# Patient Record
Sex: FEMALE | Race: BLACK OR AFRICAN AMERICAN | NOT HISPANIC OR LATINO
[De-identification: names, ages, dates, MRNs, and addresses within clinical notes are randomized per-mention and may not be internally consistent; named-entity substitution may affect disease eponyms.]

---

## 2017-09-28 PROBLEM — Z00.00 ENCOUNTER FOR PREVENTIVE HEALTH EXAMINATION: Status: ACTIVE | Noted: 2017-09-28

## 2019-09-24 ENCOUNTER — APPOINTMENT (OUTPATIENT)
Dept: SURGERY | Facility: CLINIC | Age: 34
End: 2019-09-24
Payer: MEDICAID

## 2019-09-24 VITALS
WEIGHT: 180 LBS | HEART RATE: 85 BPM | TEMPERATURE: 98.5 F | BODY MASS INDEX: 33.13 KG/M2 | DIASTOLIC BLOOD PRESSURE: 78 MMHG | SYSTOLIC BLOOD PRESSURE: 115 MMHG | HEIGHT: 62 IN

## 2019-09-24 DIAGNOSIS — Z86.2 PERSONAL HISTORY OF DISEASES OF THE BLOOD AND BLOOD-FORMING ORGANS AND CERTAIN DISORDERS INVOLVING THE IMMUNE MECHANISM: ICD-10-CM

## 2019-09-24 DIAGNOSIS — Z78.9 OTHER SPECIFIED HEALTH STATUS: ICD-10-CM

## 2019-09-24 DIAGNOSIS — Z83.3 FAMILY HISTORY OF DIABETES MELLITUS: ICD-10-CM

## 2019-09-24 DIAGNOSIS — L02.31 CUTANEOUS ABSCESS OF BUTTOCK: ICD-10-CM

## 2019-09-24 DIAGNOSIS — Z87.09 PERSONAL HISTORY OF OTHER DISEASES OF THE RESPIRATORY SYSTEM: ICD-10-CM

## 2019-09-24 DIAGNOSIS — Z82.49 FAMILY HISTORY OF ISCHEMIC HEART DISEASE AND OTHER DISEASES OF THE CIRCULATORY SYSTEM: ICD-10-CM

## 2019-09-24 PROCEDURE — 99203 OFFICE O/P NEW LOW 30 MIN: CPT

## 2019-09-24 NOTE — PHYSICAL EXAM
[Normal Rate and Rhythm] : normal rate and rhythm [Normal Breath Sounds] : Normal breath sounds [No Rash or Lesion] : No rash or lesion [Oriented to Person] : oriented to person [Alert] : alert [Oriented to Place] : oriented to place [Calm] : calm [Oriented to Time] : oriented to time [de-identified] : A/Ox3; NAD. appears comfortable [de-identified] : supple, no JVD [de-identified] : EOMI [de-identified] : abd is soft, NT/ND\par  [de-identified] : MYRTLE is negative, no masses felt, no evidence of anal fistula. she has an abscess, with a lot of tenderness to palpation to the R. inner aspect of buttocks ; scar noted to the L. inner buttock region likely from previous abscess  [de-identified] : +ROM, no joint swelling

## 2019-09-24 NOTE — HISTORY OF PRESENT ILLNESS
[de-identified] : 34 y.o F presents w the cc of having a painful abscess to the R. inner aspect of buttock region. She states she first noticed the abscess about 2 weeks ago, and she had presented to the urgent care and was given Rx for PO Antibiotics. She was instructed to return in 1 week, however, she states the pain had not improved and worsened. She had gone to [facility], last week Saturday and had an I&D. She also had a CT scan done which had showed an abscess and inguinal adenopathy. \par Patient states this is the second time she has experienced this , with first episode occurring 1 year ago to the other side. She has a scar noted on the L. inner buttock region, likely 2/2 I&D. Denies any abdominal pain. No nausea/diarrhea reported. \par Previous surgical hx includes .

## 2019-09-24 NOTE — REVIEW OF SYSTEMS
[Fever] : no fever [Chills] : no chills [Feeling Poorly] : not feeling poorly [Sore Throat] : no sore throat [Eyesight Problems] : no eyesight problems [Chest Pain] : no chest pain [Shortness Of Breath] : no shortness of breath [Wheezing] : no wheezing [Lower Ext Edema] : no lower extremity edema [Abdominal Pain] : no abdominal pain [Pelvic Pain] : no pelvic pain [Diarrhea] : no diarrhea [Arthralgias] : no arthralgias [Joint Pain] : no joint pain [Dizziness] : no dizziness [Anxiety] : no anxiety [Muscle Weakness] : no muscle weakness [Swollen Glands] : no swollen glands [de-identified] : has an abscess to the R. buttock, s/p I&D, last week

## 2019-09-24 NOTE — CONSULT LETTER
[Dear  ___] : Dear  [unfilled], [Consult Letter:] : I had the pleasure of evaluating your patient, [unfilled]. [Consult Closing:] : Thank you very much for allowing me to participate in the care of this patient.  If you have any questions, please do not hesitate to contact me. [Sincerely,] : Sincerely, [FreeTextEntry3] : Audie Gonzales MD\par

## 2019-09-24 NOTE — ASSESSMENT
[FreeTextEntry1] : 34 y.o F presents with an abscess to the R. inner buttock, s/p I&D at [facility], 1 week ago.

## 2019-09-24 NOTE — PLAN
[FreeTextEntry1] : abscess appears to be healing, no further I&D indicated at this time\par findings were discussed w the patient in detail\par Patient's questions and concerns addressed to her satisfaction, and patient verbalized an understanding of the information discussed.\par return to office for follow up visit, 1 month , she was instructed to take a fleet enema for rigid sigmoidoscopy for her next visit

## 2019-10-22 ENCOUNTER — APPOINTMENT (OUTPATIENT)
Dept: SURGERY | Facility: CLINIC | Age: 34
End: 2019-10-22

## 2021-06-21 ENCOUNTER — RESULT REVIEW (OUTPATIENT)
Age: 36
End: 2021-06-21

## 2022-06-24 ENCOUNTER — ASOB RESULT (OUTPATIENT)
Age: 37
End: 2022-06-24

## 2022-06-24 ENCOUNTER — APPOINTMENT (OUTPATIENT)
Dept: ANTEPARTUM | Facility: CLINIC | Age: 37
End: 2022-06-24

## 2022-06-24 VITALS
HEIGHT: 62 IN | BODY MASS INDEX: 32.76 KG/M2 | DIASTOLIC BLOOD PRESSURE: 97 MMHG | WEIGHT: 178 LBS | SYSTOLIC BLOOD PRESSURE: 143 MMHG | HEART RATE: 75 BPM

## 2022-06-24 DIAGNOSIS — O09.521 SUPERVISION OF ELDERLY MULTIGRAVIDA, FIRST TRIMESTER: ICD-10-CM

## 2022-06-24 PROCEDURE — 76813 OB US NUCHAL MEAS 1 GEST: CPT

## 2022-06-24 PROCEDURE — 76801 OB US < 14 WKS SINGLE FETUS: CPT | Mod: 59

## 2022-06-24 PROCEDURE — 36416 COLLJ CAPILLARY BLOOD SPEC: CPT

## 2022-06-24 PROCEDURE — 99205 OFFICE O/P NEW HI 60 MIN: CPT | Mod: 25

## 2022-07-05 ENCOUNTER — APPOINTMENT (OUTPATIENT)
Dept: CARDIOLOGY | Facility: CLINIC | Age: 37
End: 2022-07-05

## 2022-07-05 ENCOUNTER — NON-APPOINTMENT (OUTPATIENT)
Age: 37
End: 2022-07-05

## 2022-07-05 VITALS
BODY MASS INDEX: 33.13 KG/M2 | WEIGHT: 180 LBS | OXYGEN SATURATION: 97 % | HEART RATE: 77 BPM | HEIGHT: 62 IN | DIASTOLIC BLOOD PRESSURE: 90 MMHG | SYSTOLIC BLOOD PRESSURE: 141 MMHG

## 2022-07-05 PROCEDURE — 93000 ELECTROCARDIOGRAM COMPLETE: CPT

## 2022-07-05 PROCEDURE — 99204 OFFICE O/P NEW MOD 45 MIN: CPT

## 2022-07-05 RX ORDER — NIFEDIPINE 30 MG/1
30 TABLET, FILM COATED, EXTENDED RELEASE ORAL DAILY
Qty: 30 | Refills: 2 | Status: DISCONTINUED | COMMUNITY
Start: 2022-06-24 | End: 2022-07-05

## 2022-07-05 NOTE — HISTORY OF PRESENT ILLNESS
[FreeTextEntry1] : Ms. LIZZY CRUMP 37 year old F is here 2022 to establish care in the [facility] program.\par Patient is a 37 yr old  with strong Family history of HTN and DM and personal history of pregnancy induced hypertension and prior history of severe preeclampsia is currently pregnant L4 GA 15 weeks. \par Denies chest pain, shortness of breath, dizziness,  palpitations or near syncope or syncope, orthopnea, PND and increasing lower extremity edema. Reports occasional lightheadedness and sees spots in front of her eyes. She was prescribed Procardia by her OB team but reports due to HA is unable to take the medication .\par BP log at home 147- 157/\par \par OB history :\par -  @ [facility] no complications \par - C/ section due to Preeclampsia @ 32 weeks \par - C/ section due to Preeclampsia @ 36 weeks\par  - c/section due to Preclampsia @ 32 weeks \par -  \par - Miscarriage @ 2 months \par - Current pregnancy \par \par # Pregnancy induced hypertension: \par BP labile \par Was prescribed Procardia XL 30 mg ( sever HA ) \par D/c Procardia \par Labetalol 100 mg bid \par Encouraged Patient to monitor BP at home and keep a log and report results back to us for evaluation. Based on results, we will adjust medications as necessary. \par Additionally, encouraged heart healthy diet and exercise as tolerated.\par EKG with no acute changes.\par

## 2022-07-05 NOTE — DISCUSSION/SUMMARY
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 \par \par # Pregnancy induced hypertension: \par BP labile \par D/c Procardia ( unable to tolerate : HA )  \par Labetalol 100 mg bid \par Encouraged Patient to monitor BP at home and keep a log and report results back to us for evaluation. Based on results, we will adjust medications as necessary. \par Additionally, encouraged heart healthy diet and exercise as tolerated.\par Echocardiogram to assess the structural and valvular function.\par \par FU 8 weeks \par

## 2022-07-12 LAB
ALBUMIN SERPL ELPH-MCNC: 4 G/DL
ALP BLD-CCNC: 57 U/L
ALT SERPL-CCNC: 8 U/L
ANION GAP SERPL CALC-SCNC: 14 MMOL/L
APTT 2H P INC PPP: NORMAL
APTT IMM NP/PRE NP PPP: NORMAL
APTT INV RATIO PPP: 28.5 SEC
AST SERPL-CCNC: 16 U/L
B2 GLYCOPROT1 IGG SER-ACNC: <5 SGU
B2 GLYCOPROT1 IGM SER-ACNC: 11.7 SMU
BILIRUB SERPL-MCNC: 0.3 MG/DL
BUN SERPL-MCNC: 7 MG/DL
CALCIUM SERPL-MCNC: 9.2 MG/DL
CARDIOLIPIN IGM SER-MCNC: 9.6 MPL
CARDIOLIPIN IGM SER-MCNC: <5 GPL
CHLORIDE SERPL-SCNC: 99 MMOL/L
CO2 SERPL-SCNC: 23 MMOL/L
CONFIRM: 27.2 SEC
CREAT SERPL-MCNC: 0.57 MG/DL
DRVVT IMM 1:2 NP PPP: NORMAL
DRVVT SCREEN TO CONFIRM RATIO: 1.02 RATIO
EGFR: 121 ML/MIN/1.73M2
GLUCOSE SERPL-MCNC: 39 MG/DL
NPP NORMAL POOLED PLASMA: NORMAL SECS
POTASSIUM SERPL-SCNC: 4.8 MMOL/L
PROT SERPL-MCNC: 7.2 G/DL
SCREEN DRVVT: 32.9 SEC
SILICA CLOTTING TIME INTERPRETATION: NORMAL
SILICA CLOTTING TIME S/C: 0.92 RATIO
SODIUM SERPL-SCNC: 136 MMOL/L

## 2022-07-13 ENCOUNTER — APPOINTMENT (OUTPATIENT)
Dept: ANTEPARTUM | Facility: CLINIC | Age: 37
End: 2022-07-13

## 2022-07-13 ENCOUNTER — ASOB RESULT (OUTPATIENT)
Age: 37
End: 2022-07-13

## 2022-07-13 VITALS
BODY MASS INDEX: 33.31 KG/M2 | DIASTOLIC BLOOD PRESSURE: 91 MMHG | HEART RATE: 81 BPM | HEIGHT: 62 IN | SYSTOLIC BLOOD PRESSURE: 137 MMHG | WEIGHT: 181 LBS

## 2022-07-13 PROCEDURE — 99214 OFFICE O/P EST MOD 30 MIN: CPT

## 2022-07-13 PROCEDURE — 76805 OB US >/= 14 WKS SNGL FETUS: CPT

## 2022-08-09 ENCOUNTER — APPOINTMENT (OUTPATIENT)
Dept: ANTEPARTUM | Facility: CLINIC | Age: 37
End: 2022-08-09

## 2022-08-24 ENCOUNTER — LABORATORY RESULT (OUTPATIENT)
Age: 37
End: 2022-08-24

## 2022-08-24 ENCOUNTER — ASOB RESULT (OUTPATIENT)
Age: 37
End: 2022-08-24

## 2022-08-24 ENCOUNTER — APPOINTMENT (OUTPATIENT)
Dept: ANTEPARTUM | Facility: CLINIC | Age: 37
End: 2022-08-24

## 2022-08-24 PROCEDURE — 76811 OB US DETAILED SNGL FETUS: CPT

## 2022-08-25 DIAGNOSIS — O35.9XX0 MATERNAL CARE FOR (SUSPECTED) FETAL ABNORMALITY AND DAMAGE, UNSPECIFIED, NOT APPLICABLE OR UNSPECIFIED: ICD-10-CM

## 2022-08-29 ENCOUNTER — APPOINTMENT (OUTPATIENT)
Dept: PEDIATRIC CARDIOLOGY | Facility: CLINIC | Age: 37
End: 2022-08-29

## 2022-08-29 PROCEDURE — 99202 OFFICE O/P NEW SF 15 MIN: CPT | Mod: 25

## 2022-08-29 PROCEDURE — 93325 DOPPLER ECHO COLOR FLOW MAPG: CPT

## 2022-08-29 PROCEDURE — 76827 ECHO EXAM OF FETAL HEART: CPT

## 2022-08-29 PROCEDURE — 76825 ECHO EXAM OF FETAL HEART: CPT

## 2022-08-30 ENCOUNTER — APPOINTMENT (OUTPATIENT)
Dept: CARDIOLOGY | Facility: CLINIC | Age: 37
End: 2022-08-30

## 2022-08-30 VITALS
HEIGHT: 62 IN | BODY MASS INDEX: 33.84 KG/M2 | DIASTOLIC BLOOD PRESSURE: 90 MMHG | SYSTOLIC BLOOD PRESSURE: 137 MMHG | TEMPERATURE: 98.4 F | HEART RATE: 96 BPM | OXYGEN SATURATION: 98 %

## 2022-08-30 VITALS — WEIGHT: 195 LBS | BODY MASS INDEX: 35.67 KG/M2

## 2022-08-30 PROCEDURE — 99213 OFFICE O/P EST LOW 20 MIN: CPT | Mod: 25

## 2022-08-30 PROCEDURE — 93000 ELECTROCARDIOGRAM COMPLETE: CPT

## 2022-08-30 NOTE — DISCUSSION/SUMMARY
[EKG obtained to assist in diagnosis and management of assessed problem(s)] : EKG obtained to assist in diagnosis and management of assessed problem(s) [FreeTextEntry1] : 37 year old woman here for followup of PIH. \par \par # Pregnancy induced hypertension: \par \par Labetalol 100 mg bid --increase to 400 mg TID\par \par FU 8 weeks \par

## 2022-09-22 ENCOUNTER — ASOB RESULT (OUTPATIENT)
Age: 37
End: 2022-09-22

## 2022-09-22 ENCOUNTER — OUTPATIENT (OUTPATIENT)
Dept: OUTPATIENT SERVICES | Facility: HOSPITAL | Age: 37
LOS: 1 days | End: 2022-09-22
Payer: MEDICAID

## 2022-09-22 ENCOUNTER — APPOINTMENT (OUTPATIENT)
Dept: ANTEPARTUM | Facility: CLINIC | Age: 37
End: 2022-09-22

## 2022-09-22 VITALS
WEIGHT: 202 LBS | HEART RATE: 103 BPM | BODY MASS INDEX: 37.17 KG/M2 | DIASTOLIC BLOOD PRESSURE: 78 MMHG | SYSTOLIC BLOOD PRESSURE: 123 MMHG | HEIGHT: 62 IN

## 2022-09-22 DIAGNOSIS — O26.899 OTHER SPECIFIED PREGNANCY RELATED CONDITIONS, UNSPECIFIED TRIMESTER: ICD-10-CM

## 2022-09-22 DIAGNOSIS — Z3A.00 WEEKS OF GESTATION OF PREGNANCY NOT SPECIFIED: ICD-10-CM

## 2022-09-22 LAB
ALBUMIN SERPL ELPH-MCNC: 2.4 G/DL — LOW (ref 3.5–5)
ALP SERPL-CCNC: 60 U/L — SIGNIFICANT CHANGE UP (ref 40–120)
ALT FLD-CCNC: 17 U/L DA — SIGNIFICANT CHANGE UP (ref 10–60)
ANION GAP SERPL CALC-SCNC: 9 MMOL/L — SIGNIFICANT CHANGE UP (ref 5–17)
APPEARANCE UR: CLEAR — SIGNIFICANT CHANGE UP
APTT BLD: 24.9 SEC — LOW (ref 27.5–35.5)
AST SERPL-CCNC: 14 U/L — SIGNIFICANT CHANGE UP (ref 10–40)
BACTERIA # UR AUTO: ABNORMAL /HPF
BASOPHILS # BLD AUTO: 0.01 K/UL — SIGNIFICANT CHANGE UP (ref 0–0.2)
BASOPHILS NFR BLD AUTO: 0.2 % — SIGNIFICANT CHANGE UP (ref 0–2)
BILIRUB SERPL-MCNC: 0.3 MG/DL — SIGNIFICANT CHANGE UP (ref 0.2–1.2)
BILIRUB UR-MCNC: NEGATIVE — SIGNIFICANT CHANGE UP
BUN SERPL-MCNC: 6 MG/DL — LOW (ref 7–18)
CALCIUM SERPL-MCNC: 8.5 MG/DL — SIGNIFICANT CHANGE UP (ref 8.4–10.5)
CHLORIDE SERPL-SCNC: 105 MMOL/L — SIGNIFICANT CHANGE UP (ref 96–108)
CO2 SERPL-SCNC: 23 MMOL/L — SIGNIFICANT CHANGE UP (ref 22–31)
COLOR SPEC: YELLOW — SIGNIFICANT CHANGE UP
CREAT ?TM UR-MCNC: 198 MG/DL — SIGNIFICANT CHANGE UP
CREAT SERPL-MCNC: 0.46 MG/DL — LOW (ref 0.5–1.3)
DIFF PNL FLD: ABNORMAL
EGFR: 127 ML/MIN/1.73M2 — SIGNIFICANT CHANGE UP
EOSINOPHIL # BLD AUTO: 0.22 K/UL — SIGNIFICANT CHANGE UP (ref 0–0.5)
EOSINOPHIL NFR BLD AUTO: 3.6 % — SIGNIFICANT CHANGE UP (ref 0–6)
EPI CELLS # UR: ABNORMAL /HPF
FIBRINOGEN PPP-MCNC: 573 MG/DL — HIGH (ref 340–550)
GLUCOSE SERPL-MCNC: 89 MG/DL — SIGNIFICANT CHANGE UP (ref 70–99)
GLUCOSE UR QL: NEGATIVE — SIGNIFICANT CHANGE UP
HCT VFR BLD CALC: 32.7 % — LOW (ref 34.5–45)
HGB BLD-MCNC: 10.4 G/DL — LOW (ref 11.5–15.5)
IMM GRANULOCYTES NFR BLD AUTO: 0.2 % — SIGNIFICANT CHANGE UP (ref 0–0.9)
INR BLD: 0.96 RATIO — SIGNIFICANT CHANGE UP (ref 0.88–1.16)
KETONES UR-MCNC: NEGATIVE — SIGNIFICANT CHANGE UP
LDH SERPL L TO P-CCNC: 137 U/L — SIGNIFICANT CHANGE UP (ref 120–225)
LEUKOCYTE ESTERASE UR-ACNC: ABNORMAL
LYMPHOCYTES # BLD AUTO: 1.7 K/UL — SIGNIFICANT CHANGE UP (ref 1–3.3)
LYMPHOCYTES # BLD AUTO: 27.8 % — SIGNIFICANT CHANGE UP (ref 13–44)
MCHC RBC-ENTMCNC: 24.5 PG — LOW (ref 27–34)
MCHC RBC-ENTMCNC: 31.8 GM/DL — LOW (ref 32–36)
MCV RBC AUTO: 77.1 FL — LOW (ref 80–100)
MONOCYTES # BLD AUTO: 0.46 K/UL — SIGNIFICANT CHANGE UP (ref 0–0.9)
MONOCYTES NFR BLD AUTO: 7.5 % — SIGNIFICANT CHANGE UP (ref 2–14)
NEUTROPHILS # BLD AUTO: 3.72 K/UL — SIGNIFICANT CHANGE UP (ref 1.8–7.4)
NEUTROPHILS NFR BLD AUTO: 60.7 % — SIGNIFICANT CHANGE UP (ref 43–77)
NITRITE UR-MCNC: NEGATIVE — SIGNIFICANT CHANGE UP
NRBC # BLD: 0 /100 WBCS — SIGNIFICANT CHANGE UP (ref 0–0)
PH UR: 6 — SIGNIFICANT CHANGE UP (ref 5–8)
PLATELET # BLD AUTO: 186 K/UL — SIGNIFICANT CHANGE UP (ref 150–400)
POTASSIUM SERPL-MCNC: 3.2 MMOL/L — LOW (ref 3.5–5.3)
POTASSIUM SERPL-SCNC: 3.2 MMOL/L — LOW (ref 3.5–5.3)
PROT ?TM UR-MCNC: 29 MG/DL — HIGH (ref 0–12)
PROT SERPL-MCNC: 6.9 G/DL — SIGNIFICANT CHANGE UP (ref 6–8.3)
PROT UR-MCNC: 30 MG/DL
PROTHROM AB SERPL-ACNC: 11.4 SEC — SIGNIFICANT CHANGE UP (ref 10.5–13.4)
RBC # BLD: 4.24 M/UL — SIGNIFICANT CHANGE UP (ref 3.8–5.2)
RBC # FLD: 16.9 % — HIGH (ref 10.3–14.5)
RBC CASTS # UR COMP ASSIST: ABNORMAL /HPF (ref 0–2)
SODIUM SERPL-SCNC: 137 MMOL/L — SIGNIFICANT CHANGE UP (ref 135–145)
SP GR SPEC: 1.02 — SIGNIFICANT CHANGE UP (ref 1.01–1.02)
URATE SERPL-MCNC: 4.2 MG/DL — SIGNIFICANT CHANGE UP (ref 2.5–7)
UROBILINOGEN FLD QL: NEGATIVE — SIGNIFICANT CHANGE UP
WBC # BLD: 6.12 K/UL — SIGNIFICANT CHANGE UP (ref 3.8–10.5)
WBC # FLD AUTO: 6.12 K/UL — SIGNIFICANT CHANGE UP (ref 3.8–10.5)
WBC UR QL: SIGNIFICANT CHANGE UP /HPF (ref 0–5)

## 2022-09-22 PROCEDURE — 84156 ASSAY OF PROTEIN URINE: CPT

## 2022-09-22 PROCEDURE — 99214 OFFICE O/P EST MOD 30 MIN: CPT

## 2022-09-22 PROCEDURE — 85025 COMPLETE CBC W/AUTO DIFF WBC: CPT

## 2022-09-22 PROCEDURE — 82570 ASSAY OF URINE CREATININE: CPT

## 2022-09-22 PROCEDURE — 80053 COMPREHEN METABOLIC PANEL: CPT

## 2022-09-22 PROCEDURE — G0463: CPT

## 2022-09-22 PROCEDURE — 59025 FETAL NON-STRESS TEST: CPT

## 2022-09-22 PROCEDURE — 81001 URINALYSIS AUTO W/SCOPE: CPT

## 2022-09-22 PROCEDURE — 76820 UMBILICAL ARTERY ECHO: CPT | Mod: 59

## 2022-09-22 PROCEDURE — 85384 FIBRINOGEN ACTIVITY: CPT

## 2022-09-22 PROCEDURE — 85730 THROMBOPLASTIN TIME PARTIAL: CPT

## 2022-09-22 PROCEDURE — 83615 LACTATE (LD) (LDH) ENZYME: CPT

## 2022-09-22 PROCEDURE — 93970 EXTREMITY STUDY: CPT

## 2022-09-22 PROCEDURE — 76816 OB US FOLLOW-UP PER FETUS: CPT

## 2022-09-22 PROCEDURE — 84550 ASSAY OF BLOOD/URIC ACID: CPT

## 2022-09-22 PROCEDURE — 93970 EXTREMITY STUDY: CPT | Mod: 26

## 2022-09-22 PROCEDURE — 85610 PROTHROMBIN TIME: CPT

## 2022-09-22 PROCEDURE — 36415 COLL VENOUS BLD VENIPUNCTURE: CPT

## 2022-09-22 RX ORDER — ASPIRIN 81 MG/1
81 TABLET, CHEWABLE ORAL
Qty: 50 | Refills: 3 | Status: ACTIVE | COMMUNITY
Start: 2022-06-24 | End: 1900-01-01

## 2022-09-22 RX ORDER — LABETALOL HCL 100 MG
400 TABLET ORAL ONCE
Refills: 0 | Status: COMPLETED | OUTPATIENT
Start: 2022-09-22 | End: 2022-09-22

## 2022-09-22 RX ADMIN — Medication 400 MILLIGRAM(S): at 18:31

## 2022-10-12 ENCOUNTER — ASOB RESULT (OUTPATIENT)
Age: 37
End: 2022-10-12

## 2022-10-12 ENCOUNTER — APPOINTMENT (OUTPATIENT)
Dept: ANTEPARTUM | Facility: CLINIC | Age: 37
End: 2022-10-12

## 2022-10-12 VITALS
BODY MASS INDEX: 38.09 KG/M2 | HEIGHT: 62 IN | HEART RATE: 98 BPM | SYSTOLIC BLOOD PRESSURE: 129 MMHG | DIASTOLIC BLOOD PRESSURE: 80 MMHG | WEIGHT: 207 LBS

## 2022-10-12 PROCEDURE — 76816 OB US FOLLOW-UP PER FETUS: CPT

## 2022-10-12 PROCEDURE — 76820 UMBILICAL ARTERY ECHO: CPT | Mod: 59

## 2022-10-21 ENCOUNTER — APPOINTMENT (OUTPATIENT)
Dept: CV DIAGNOSITCS | Facility: HOSPITAL | Age: 37
End: 2022-10-21

## 2022-10-21 ENCOUNTER — OUTPATIENT (OUTPATIENT)
Dept: OUTPATIENT SERVICES | Facility: HOSPITAL | Age: 37
LOS: 1 days | End: 2022-10-21

## 2022-10-21 ENCOUNTER — APPOINTMENT (OUTPATIENT)
Dept: CARDIOLOGY | Facility: CLINIC | Age: 37
End: 2022-10-21

## 2022-10-21 ENCOUNTER — NON-APPOINTMENT (OUTPATIENT)
Age: 37
End: 2022-10-21

## 2022-10-21 VITALS
DIASTOLIC BLOOD PRESSURE: 82 MMHG | WEIGHT: 209 LBS | SYSTOLIC BLOOD PRESSURE: 135 MMHG | HEIGHT: 62 IN | BODY MASS INDEX: 38.46 KG/M2 | HEART RATE: 100 BPM | OXYGEN SATURATION: 99 %

## 2022-10-21 PROCEDURE — 99214 OFFICE O/P EST MOD 30 MIN: CPT | Mod: 25

## 2022-10-21 PROCEDURE — 93306 TTE W/DOPPLER COMPLETE: CPT | Mod: 26

## 2022-10-21 PROCEDURE — 93000 ELECTROCARDIOGRAM COMPLETE: CPT

## 2022-10-21 RX ORDER — LABETALOL HYDROCHLORIDE 300 MG/1
300 TABLET, FILM COATED ORAL EVERY 8 HOURS
Qty: 180 | Refills: 3 | Status: ACTIVE | COMMUNITY
Start: 2022-07-05 | End: 1900-01-01

## 2022-10-21 NOTE — DISCUSSION/SUMMARY
[EKG obtained to assist in diagnosis and management of assessed problem(s)] : EKG obtained to assist in diagnosis and management of assessed problem(s) [FreeTextEntry1] : 37 year old woman here for followup of PIH. \par \par # Pregnancy induced hypertension: \par \par Labetalol 400 mg TID-->increased to 600 mg TID\par \par FU 4 weeks \par

## 2022-10-21 NOTE — HISTORY OF PRESENT ILLNESS
[FreeTextEntry1] : 37 year old woman now 30 weeks pregnant with history of PEC and HTN here for followup. At last visit we switched her Procardia to Labetalol due to severe HA. \par Her Labetalol was increased recently to 400 mg TID\par \par OB history :\par 2002-  @ [facility] no complications \par - C/ section due to Preeclampsia @ 32 weeks \par - C/ section due to Preeclampsia @ 36 weeks\par  - c/section due to Preclampsia @ 32 weeks \par -  \par - Miscarriage @ 2 months \par - Current pregnancy \par \par TTE done today and images reviewed- normal LV function\par \par # Pregnancy induced hypertension: \par BP labile \par Labetalol 400 mg TID\par Will increase to Labetalol 600 mg TID\par

## 2022-11-09 ENCOUNTER — ASOB RESULT (OUTPATIENT)
Age: 37
End: 2022-11-09

## 2022-11-09 ENCOUNTER — APPOINTMENT (OUTPATIENT)
Dept: ANTEPARTUM | Facility: CLINIC | Age: 37
End: 2022-11-09

## 2022-11-09 PROCEDURE — 76816 OB US FOLLOW-UP PER FETUS: CPT

## 2022-11-09 PROCEDURE — 99214 OFFICE O/P EST MOD 30 MIN: CPT

## 2022-11-14 VITALS — HEIGHT: 62 IN | DIASTOLIC BLOOD PRESSURE: 88 MMHG | SYSTOLIC BLOOD PRESSURE: 142 MMHG

## 2022-11-14 VITALS — DIASTOLIC BLOOD PRESSURE: 88 MMHG | SYSTOLIC BLOOD PRESSURE: 142 MMHG

## 2022-11-18 ENCOUNTER — ASOB RESULT (OUTPATIENT)
Age: 37
End: 2022-11-18

## 2022-11-18 ENCOUNTER — APPOINTMENT (OUTPATIENT)
Dept: ANTEPARTUM | Facility: CLINIC | Age: 37
End: 2022-11-18

## 2022-11-18 ENCOUNTER — NON-APPOINTMENT (OUTPATIENT)
Age: 37
End: 2022-11-18

## 2022-11-18 ENCOUNTER — APPOINTMENT (OUTPATIENT)
Dept: CARDIOLOGY | Facility: CLINIC | Age: 37
End: 2022-11-18

## 2022-11-18 VITALS — SYSTOLIC BLOOD PRESSURE: 139 MMHG | DIASTOLIC BLOOD PRESSURE: 82 MMHG

## 2022-11-18 VITALS — BODY MASS INDEX: 39.32 KG/M2 | WEIGHT: 215 LBS

## 2022-11-18 VITALS
HEIGHT: 62 IN | OXYGEN SATURATION: 96 % | HEART RATE: 85 BPM | SYSTOLIC BLOOD PRESSURE: 162 MMHG | BODY MASS INDEX: 39.32 KG/M2 | DIASTOLIC BLOOD PRESSURE: 96 MMHG

## 2022-11-18 DIAGNOSIS — O10.919 UNSPECIFIED PRE-EXISTING HYPERTENSION COMPLICATING PREGNANCY, UNSPECIFIED TRIMESTER: ICD-10-CM

## 2022-11-18 DIAGNOSIS — O14.90 UNSPECIFIED PRE-ECLAMPSIA, UNSPECIFIED TRIMESTER: ICD-10-CM

## 2022-11-18 PROCEDURE — 76818 FETAL BIOPHYS PROFILE W/NST: CPT

## 2022-11-18 PROCEDURE — 99213 OFFICE O/P EST LOW 20 MIN: CPT | Mod: 25

## 2022-11-18 PROCEDURE — 93000 ELECTROCARDIOGRAM COMPLETE: CPT

## 2022-11-18 RX ORDER — LABETALOL HYDROCHLORIDE 200 MG/1
200 TABLET, FILM COATED ORAL 3 TIMES DAILY
Qty: 90 | Refills: 3 | Status: ACTIVE | COMMUNITY
Start: 2022-11-18 | End: 1900-01-01

## 2022-11-18 NOTE — DISCUSSION/SUMMARY
[EKG obtained to assist in diagnosis and management of assessed problem(s)] : EKG obtained to assist in diagnosis and management of assessed problem(s) [FreeTextEntry1] : 37 year old woman here for followup of PIH. \par \par # Pregnancy induced hypertension: \par \par Labetalol 600 mg TID--will increase to 800 mg TID\par Has followup on Monday with OB\par \par FU 4 weeks \par

## 2022-11-18 NOTE — HISTORY OF PRESENT ILLNESS
[FreeTextEntry1] : 37 year old woman now 34 weeks pregnant with history of PEC and HTN here for followup. \par Her Labetalol was increased recently to 600 mg TID Not checking her BP but willing to try\par \par OB history :\par 2002-  @ [facility] no complications \par - C/ section due to Preeclampsia @ 32 weeks \par - C/ section due to Preeclampsia @ 36 weeks\par  - c/section due to Preclampsia @ 32 weeks \par -  \par - Miscarriage @ 2 months \par - Current pregnancy \par \par TTE done today and images reviewed- normal LV function\par \par # Pregnancy induced hypertension: \par BP labile \par Labetalol 600 mg TID\par \par

## 2022-11-23 ENCOUNTER — OUTPATIENT (OUTPATIENT)
Dept: OUTPATIENT SERVICES | Facility: HOSPITAL | Age: 37
LOS: 1 days | End: 2022-11-23
Payer: MEDICAID

## 2022-11-23 ENCOUNTER — ASOB RESULT (OUTPATIENT)
Age: 37
End: 2022-11-23

## 2022-11-23 ENCOUNTER — APPOINTMENT (OUTPATIENT)
Dept: ANTEPARTUM | Facility: CLINIC | Age: 37
End: 2022-11-23

## 2022-11-23 DIAGNOSIS — Z3A.00 WEEKS OF GESTATION OF PREGNANCY NOT SPECIFIED: ICD-10-CM

## 2022-11-23 DIAGNOSIS — O26.899 OTHER SPECIFIED PREGNANCY RELATED CONDITIONS, UNSPECIFIED TRIMESTER: ICD-10-CM

## 2022-11-23 LAB
ALBUMIN SERPL ELPH-MCNC: 2.2 G/DL — LOW (ref 3.5–5)
ALP SERPL-CCNC: 85 U/L — SIGNIFICANT CHANGE UP (ref 40–120)
ALT FLD-CCNC: 16 U/L DA — SIGNIFICANT CHANGE UP (ref 10–60)
ANION GAP SERPL CALC-SCNC: 8 MMOL/L — SIGNIFICANT CHANGE UP (ref 5–17)
APPEARANCE UR: CLEAR — SIGNIFICANT CHANGE UP
APTT BLD: 24.8 SEC — LOW (ref 27.5–35.5)
AST SERPL-CCNC: 17 U/L — SIGNIFICANT CHANGE UP (ref 10–40)
BACTERIA # UR AUTO: ABNORMAL /HPF
BASOPHILS # BLD AUTO: 0.01 K/UL — SIGNIFICANT CHANGE UP (ref 0–0.2)
BASOPHILS NFR BLD AUTO: 0.2 % — SIGNIFICANT CHANGE UP (ref 0–2)
BILIRUB SERPL-MCNC: 0.3 MG/DL — SIGNIFICANT CHANGE UP (ref 0.2–1.2)
BILIRUB UR-MCNC: NEGATIVE — SIGNIFICANT CHANGE UP
BUN SERPL-MCNC: 6 MG/DL — LOW (ref 7–18)
CALCIUM SERPL-MCNC: 8.6 MG/DL — SIGNIFICANT CHANGE UP (ref 8.4–10.5)
CHLORIDE SERPL-SCNC: 106 MMOL/L — SIGNIFICANT CHANGE UP (ref 96–108)
CO2 SERPL-SCNC: 22 MMOL/L — SIGNIFICANT CHANGE UP (ref 22–31)
COLOR SPEC: YELLOW — SIGNIFICANT CHANGE UP
CREAT ?TM UR-MCNC: 125 MG/DL — SIGNIFICANT CHANGE UP
CREAT SERPL-MCNC: 0.55 MG/DL — SIGNIFICANT CHANGE UP (ref 0.5–1.3)
DIFF PNL FLD: ABNORMAL
EGFR: 122 ML/MIN/1.73M2 — SIGNIFICANT CHANGE UP
EOSINOPHIL # BLD AUTO: 0.09 K/UL — SIGNIFICANT CHANGE UP (ref 0–0.5)
EOSINOPHIL NFR BLD AUTO: 2.1 % — SIGNIFICANT CHANGE UP (ref 0–6)
EPI CELLS # UR: SIGNIFICANT CHANGE UP /HPF
FIBRINOGEN PPP-MCNC: 608 MG/DL — HIGH (ref 340–550)
GLUCOSE SERPL-MCNC: 145 MG/DL — HIGH (ref 70–99)
GLUCOSE UR QL: NEGATIVE — SIGNIFICANT CHANGE UP
HCT VFR BLD CALC: 36.7 % — SIGNIFICANT CHANGE UP (ref 34.5–45)
HGB BLD-MCNC: 11.3 G/DL — LOW (ref 11.5–15.5)
IMM GRANULOCYTES NFR BLD AUTO: 0.2 % — SIGNIFICANT CHANGE UP (ref 0–0.9)
INR BLD: 1.02 RATIO — SIGNIFICANT CHANGE UP (ref 0.88–1.16)
KETONES UR-MCNC: NEGATIVE — SIGNIFICANT CHANGE UP
LDH SERPL L TO P-CCNC: 200 U/L — SIGNIFICANT CHANGE UP (ref 120–225)
LEUKOCYTE ESTERASE UR-ACNC: NEGATIVE — SIGNIFICANT CHANGE UP
LYMPHOCYTES # BLD AUTO: 1.45 K/UL — SIGNIFICANT CHANGE UP (ref 1–3.3)
LYMPHOCYTES # BLD AUTO: 34.3 % — SIGNIFICANT CHANGE UP (ref 13–44)
MCHC RBC-ENTMCNC: 24.6 PG — LOW (ref 27–34)
MCHC RBC-ENTMCNC: 30.8 GM/DL — LOW (ref 32–36)
MCV RBC AUTO: 79.8 FL — LOW (ref 80–100)
MONOCYTES # BLD AUTO: 0.28 K/UL — SIGNIFICANT CHANGE UP (ref 0–0.9)
MONOCYTES NFR BLD AUTO: 6.6 % — SIGNIFICANT CHANGE UP (ref 2–14)
NEUTROPHILS # BLD AUTO: 2.39 K/UL — SIGNIFICANT CHANGE UP (ref 1.8–7.4)
NEUTROPHILS NFR BLD AUTO: 56.6 % — SIGNIFICANT CHANGE UP (ref 43–77)
NITRITE UR-MCNC: NEGATIVE — SIGNIFICANT CHANGE UP
NRBC # BLD: 0 /100 WBCS — SIGNIFICANT CHANGE UP (ref 0–0)
PH UR: 7 — SIGNIFICANT CHANGE UP (ref 5–8)
PLATELET # BLD AUTO: 159 K/UL — SIGNIFICANT CHANGE UP (ref 150–400)
POTASSIUM SERPL-MCNC: 3.3 MMOL/L — LOW (ref 3.5–5.3)
POTASSIUM SERPL-SCNC: 3.3 MMOL/L — LOW (ref 3.5–5.3)
PROT ?TM UR-MCNC: 32 MG/DL — HIGH (ref 0–12)
PROT SERPL-MCNC: 6.2 G/DL — SIGNIFICANT CHANGE UP (ref 6–8.3)
PROT UR-MCNC: 30 MG/DL
PROTHROM AB SERPL-ACNC: 12.1 SEC — SIGNIFICANT CHANGE UP (ref 10.5–13.4)
RBC # BLD: 4.6 M/UL — SIGNIFICANT CHANGE UP (ref 3.8–5.2)
RBC # FLD: 18 % — HIGH (ref 10.3–14.5)
RBC CASTS # UR COMP ASSIST: ABNORMAL /HPF (ref 0–2)
SODIUM SERPL-SCNC: 136 MMOL/L — SIGNIFICANT CHANGE UP (ref 135–145)
SP GR SPEC: 1.01 — SIGNIFICANT CHANGE UP (ref 1.01–1.02)
URATE SERPL-MCNC: 4.1 MG/DL — SIGNIFICANT CHANGE UP (ref 2.5–7)
UROBILINOGEN FLD QL: NEGATIVE — SIGNIFICANT CHANGE UP
WBC # BLD: 4.23 K/UL — SIGNIFICANT CHANGE UP (ref 3.8–10.5)
WBC # FLD AUTO: 4.23 K/UL — SIGNIFICANT CHANGE UP (ref 3.8–10.5)
WBC UR QL: SIGNIFICANT CHANGE UP /HPF (ref 0–5)

## 2022-11-23 PROCEDURE — 81001 URINALYSIS AUTO W/SCOPE: CPT

## 2022-11-23 PROCEDURE — 85384 FIBRINOGEN ACTIVITY: CPT

## 2022-11-23 PROCEDURE — 83615 LACTATE (LD) (LDH) ENZYME: CPT

## 2022-11-23 PROCEDURE — 85730 THROMBOPLASTIN TIME PARTIAL: CPT

## 2022-11-23 PROCEDURE — 85025 COMPLETE CBC W/AUTO DIFF WBC: CPT

## 2022-11-23 PROCEDURE — 82570 ASSAY OF URINE CREATININE: CPT

## 2022-11-23 PROCEDURE — 84550 ASSAY OF BLOOD/URIC ACID: CPT

## 2022-11-23 PROCEDURE — 85610 PROTHROMBIN TIME: CPT

## 2022-11-23 PROCEDURE — 76818 FETAL BIOPHYS PROFILE W/NST: CPT

## 2022-11-23 PROCEDURE — 84156 ASSAY OF PROTEIN URINE: CPT

## 2022-11-23 PROCEDURE — 36415 COLL VENOUS BLD VENIPUNCTURE: CPT

## 2022-11-23 PROCEDURE — 59025 FETAL NON-STRESS TEST: CPT

## 2022-11-23 PROCEDURE — 80053 COMPREHEN METABOLIC PANEL: CPT

## 2022-11-23 PROCEDURE — G0463: CPT

## 2022-11-23 RX ORDER — LABETALOL HCL 100 MG
800 TABLET ORAL ONCE
Refills: 0 | Status: COMPLETED | OUTPATIENT
Start: 2022-11-23 | End: 2022-11-23

## 2022-11-23 RX ADMIN — Medication 800 MILLIGRAM(S): at 15:41

## 2022-12-02 ENCOUNTER — ASOB RESULT (OUTPATIENT)
Age: 37
End: 2022-12-02

## 2022-12-02 ENCOUNTER — APPOINTMENT (OUTPATIENT)
Dept: ANTEPARTUM | Facility: CLINIC | Age: 37
End: 2022-12-02

## 2022-12-02 PROCEDURE — 76818 FETAL BIOPHYS PROFILE W/NST: CPT

## 2022-12-06 ENCOUNTER — ASOB RESULT (OUTPATIENT)
Age: 37
End: 2022-12-06

## 2022-12-06 ENCOUNTER — APPOINTMENT (OUTPATIENT)
Dept: ANTEPARTUM | Facility: CLINIC | Age: 37
End: 2022-12-06

## 2022-12-06 ENCOUNTER — INPATIENT (INPATIENT)
Facility: HOSPITAL | Age: 37
LOS: 5 days | Discharge: HOME | End: 2022-12-12
Attending: STUDENT IN AN ORGANIZED HEALTH CARE EDUCATION/TRAINING PROGRAM | Admitting: STUDENT IN AN ORGANIZED HEALTH CARE EDUCATION/TRAINING PROGRAM

## 2022-12-06 ENCOUNTER — TRANSCRIPTION ENCOUNTER (OUTPATIENT)
Age: 37
End: 2022-12-06

## 2022-12-06 VITALS
HEART RATE: 102 BPM | TEMPERATURE: 99 F | RESPIRATION RATE: 16 BRPM | DIASTOLIC BLOOD PRESSURE: 90 MMHG | SYSTOLIC BLOOD PRESSURE: 140 MMHG

## 2022-12-06 DIAGNOSIS — O26.899 OTHER SPECIFIED PREGNANCY RELATED CONDITIONS, UNSPECIFIED TRIMESTER: ICD-10-CM

## 2022-12-06 DIAGNOSIS — Z33.2 ENCOUNTER FOR ELECTIVE TERMINATION OF PREGNANCY: Chronic | ICD-10-CM

## 2022-12-06 DIAGNOSIS — Z98.891 HISTORY OF UTERINE SCAR FROM PREVIOUS SURGERY: Chronic | ICD-10-CM

## 2022-12-06 LAB
ALBUMIN SERPL ELPH-MCNC: 3.3 G/DL — SIGNIFICANT CHANGE UP (ref 3.3–5)
ALP SERPL-CCNC: 106 U/L — SIGNIFICANT CHANGE UP (ref 40–120)
ALT FLD-CCNC: 14 U/L — SIGNIFICANT CHANGE UP (ref 4–33)
ANION GAP SERPL CALC-SCNC: 12 MMOL/L — SIGNIFICANT CHANGE UP (ref 7–14)
APPEARANCE UR: CLEAR — SIGNIFICANT CHANGE UP
APTT BLD: 24.6 SEC — LOW (ref 27–36.3)
AST SERPL-CCNC: 21 U/L — SIGNIFICANT CHANGE UP (ref 4–32)
BACTERIA # UR AUTO: NEGATIVE — SIGNIFICANT CHANGE UP
BASOPHILS # BLD AUTO: 0.01 K/UL — SIGNIFICANT CHANGE UP (ref 0–0.2)
BASOPHILS NFR BLD AUTO: 0.2 % — SIGNIFICANT CHANGE UP (ref 0–2)
BILIRUB SERPL-MCNC: 0.2 MG/DL — SIGNIFICANT CHANGE UP (ref 0.2–1.2)
BILIRUB UR-MCNC: NEGATIVE — SIGNIFICANT CHANGE UP
BLD GP AB SCN SERPL QL: NEGATIVE — SIGNIFICANT CHANGE UP
BUN SERPL-MCNC: 6 MG/DL — LOW (ref 7–23)
CALCIUM SERPL-MCNC: 9.2 MG/DL — SIGNIFICANT CHANGE UP (ref 8.4–10.5)
CHLORIDE SERPL-SCNC: 102 MMOL/L — SIGNIFICANT CHANGE UP (ref 98–107)
CO2 SERPL-SCNC: 21 MMOL/L — LOW (ref 22–31)
COLOR SPEC: YELLOW — SIGNIFICANT CHANGE UP
COVID-19 SPIKE DOMAIN AB INTERP: POSITIVE
COVID-19 SPIKE DOMAIN ANTIBODY RESULT: >250 U/ML — HIGH
CREAT ?TM UR-MCNC: 178 MG/DL — SIGNIFICANT CHANGE UP
CREAT SERPL-MCNC: 0.51 MG/DL — SIGNIFICANT CHANGE UP (ref 0.5–1.3)
DIFF PNL FLD: ABNORMAL
EGFR: 124 ML/MIN/1.73M2 — SIGNIFICANT CHANGE UP
EOSINOPHIL # BLD AUTO: 0.1 K/UL — SIGNIFICANT CHANGE UP (ref 0–0.5)
EOSINOPHIL NFR BLD AUTO: 2.5 % — SIGNIFICANT CHANGE UP (ref 0–6)
EPI CELLS # UR: 4 /HPF — SIGNIFICANT CHANGE UP (ref 0–5)
FIBRINOGEN PPP-MCNC: 487 MG/DL — HIGH (ref 200–465)
GLUCOSE SERPL-MCNC: 150 MG/DL — HIGH (ref 70–99)
GLUCOSE UR QL: NEGATIVE — SIGNIFICANT CHANGE UP
HCT VFR BLD CALC: 40.2 % — SIGNIFICANT CHANGE UP (ref 34.5–45)
HGB BLD-MCNC: 12.5 G/DL — SIGNIFICANT CHANGE UP (ref 11.5–15.5)
HYALINE CASTS # UR AUTO: 1 /LPF — SIGNIFICANT CHANGE UP (ref 0–7)
IANC: 2.2 K/UL — SIGNIFICANT CHANGE UP (ref 1.8–7.4)
IMM GRANULOCYTES NFR BLD AUTO: 0.2 % — SIGNIFICANT CHANGE UP (ref 0–0.9)
INR BLD: 0.98 RATIO — SIGNIFICANT CHANGE UP (ref 0.88–1.16)
KETONES UR-MCNC: NEGATIVE — SIGNIFICANT CHANGE UP
LDH SERPL L TO P-CCNC: 204 U/L — SIGNIFICANT CHANGE UP (ref 135–225)
LEUKOCYTE ESTERASE UR-ACNC: NEGATIVE — SIGNIFICANT CHANGE UP
LYMPHOCYTES # BLD AUTO: 1.36 K/UL — SIGNIFICANT CHANGE UP (ref 1–3.3)
LYMPHOCYTES # BLD AUTO: 33.8 % — SIGNIFICANT CHANGE UP (ref 13–44)
MAGNESIUM SERPL-MCNC: 3.8 MG/DL — HIGH (ref 1.6–2.6)
MCHC RBC-ENTMCNC: 25.3 PG — LOW (ref 27–34)
MCHC RBC-ENTMCNC: 31.1 GM/DL — LOW (ref 32–36)
MCV RBC AUTO: 81.2 FL — SIGNIFICANT CHANGE UP (ref 80–100)
MONOCYTES # BLD AUTO: 0.34 K/UL — SIGNIFICANT CHANGE UP (ref 0–0.9)
MONOCYTES NFR BLD AUTO: 8.5 % — SIGNIFICANT CHANGE UP (ref 2–14)
NEUTROPHILS # BLD AUTO: 2.2 K/UL — SIGNIFICANT CHANGE UP (ref 1.8–7.4)
NEUTROPHILS NFR BLD AUTO: 54.8 % — SIGNIFICANT CHANGE UP (ref 43–77)
NITRITE UR-MCNC: NEGATIVE — SIGNIFICANT CHANGE UP
NRBC # BLD: 0 /100 WBCS — SIGNIFICANT CHANGE UP (ref 0–0)
NRBC # FLD: 0 K/UL — SIGNIFICANT CHANGE UP (ref 0–0)
PH UR: 6.5 — SIGNIFICANT CHANGE UP (ref 5–8)
PLATELET # BLD AUTO: 184 K/UL — SIGNIFICANT CHANGE UP (ref 150–400)
POTASSIUM SERPL-MCNC: 4.1 MMOL/L — SIGNIFICANT CHANGE UP (ref 3.5–5.3)
POTASSIUM SERPL-SCNC: 4.1 MMOL/L — SIGNIFICANT CHANGE UP (ref 3.5–5.3)
PROT ?TM UR-MCNC: 59 MG/DL — SIGNIFICANT CHANGE UP
PROT SERPL-MCNC: 6.4 G/DL — SIGNIFICANT CHANGE UP (ref 6–8.3)
PROT UR-MCNC: ABNORMAL
PROT/CREAT UR-RTO: 0.3 RATIO — HIGH (ref 0–0.2)
PROTHROM AB SERPL-ACNC: 11.4 SEC — SIGNIFICANT CHANGE UP (ref 10.5–13.4)
RBC # BLD: 4.95 M/UL — SIGNIFICANT CHANGE UP (ref 3.8–5.2)
RBC # FLD: 19.5 % — HIGH (ref 10.3–14.5)
RBC CASTS # UR COMP ASSIST: 18 /HPF — HIGH (ref 0–4)
RH IG SCN BLD-IMP: POSITIVE — SIGNIFICANT CHANGE UP
RH IG SCN BLD-IMP: POSITIVE — SIGNIFICANT CHANGE UP
SARS-COV-2 IGG+IGM SERPL QL IA: >250 U/ML — HIGH
SARS-COV-2 IGG+IGM SERPL QL IA: POSITIVE
SARS-COV-2 RNA SPEC QL NAA+PROBE: SIGNIFICANT CHANGE UP
SODIUM SERPL-SCNC: 135 MMOL/L — SIGNIFICANT CHANGE UP (ref 135–145)
SP GR SPEC: 1.03 — SIGNIFICANT CHANGE UP (ref 1.01–1.05)
URATE SERPL-MCNC: 3.7 MG/DL — SIGNIFICANT CHANGE UP (ref 2.5–7)
UROBILINOGEN FLD QL: SIGNIFICANT CHANGE UP
WBC # BLD: 4.02 K/UL — SIGNIFICANT CHANGE UP (ref 3.8–10.5)
WBC # FLD AUTO: 4.02 K/UL — SIGNIFICANT CHANGE UP (ref 3.8–10.5)
WBC UR QL: 3 /HPF — SIGNIFICANT CHANGE UP (ref 0–5)

## 2022-12-06 PROCEDURE — 76816 OB US FOLLOW-UP PER FETUS: CPT

## 2022-12-06 PROCEDURE — 76819 FETAL BIOPHYS PROFIL W/O NST: CPT | Mod: 59

## 2022-12-06 DEVICE — SURGICEL POWDER 3 GRAMS: Type: IMPLANTABLE DEVICE | Status: FUNCTIONAL

## 2022-12-06 DEVICE — INTERCEED 3 X 4": Type: IMPLANTABLE DEVICE | Status: FUNCTIONAL

## 2022-12-06 RX ORDER — OXYTOCIN 10 UNIT/ML
VIAL (ML) INJECTION
Qty: 20 | Refills: 0 | Status: DISCONTINUED | OUTPATIENT
Start: 2022-12-06 | End: 2022-12-07

## 2022-12-06 RX ORDER — SODIUM CHLORIDE 9 MG/ML
500 INJECTION, SOLUTION INTRAVENOUS ONCE
Refills: 0 | Status: DISCONTINUED | OUTPATIENT
Start: 2022-12-07 | End: 2022-12-12

## 2022-12-06 RX ORDER — DEXAMETHASONE 0.5 MG/5ML
4 ELIXIR ORAL EVERY 6 HOURS
Refills: 0 | Status: DISCONTINUED | OUTPATIENT
Start: 2022-12-06 | End: 2022-12-07

## 2022-12-06 RX ORDER — LANOLIN
1 OINTMENT (GRAM) TOPICAL EVERY 6 HOURS
Refills: 0 | Status: DISCONTINUED | OUTPATIENT
Start: 2022-12-07 | End: 2022-12-12

## 2022-12-06 RX ORDER — NIFEDIPINE 30 MG
10 TABLET, EXTENDED RELEASE 24 HR ORAL ONCE
Refills: 0 | Status: DISCONTINUED | OUTPATIENT
Start: 2022-12-06 | End: 2022-12-07

## 2022-12-06 RX ORDER — MORPHINE SULFATE 50 MG/1
0.1 CAPSULE, EXTENDED RELEASE ORAL ONCE
Refills: 0 | Status: DISCONTINUED | OUTPATIENT
Start: 2022-12-06 | End: 2022-12-07

## 2022-12-06 RX ORDER — FAMOTIDINE 10 MG/ML
20 INJECTION INTRAVENOUS ONCE
Refills: 0 | Status: COMPLETED | OUTPATIENT
Start: 2022-12-06 | End: 2022-12-06

## 2022-12-06 RX ORDER — KETOROLAC TROMETHAMINE 30 MG/ML
30 SYRINGE (ML) INJECTION EVERY 6 HOURS
Refills: 0 | Status: DISCONTINUED | OUTPATIENT
Start: 2022-12-07 | End: 2022-12-07

## 2022-12-06 RX ORDER — LABETALOL HCL 100 MG
400 TABLET ORAL EVERY 8 HOURS
Refills: 0 | Status: DISCONTINUED | OUTPATIENT
Start: 2022-12-06 | End: 2022-12-06

## 2022-12-06 RX ORDER — MAGNESIUM HYDROXIDE 400 MG/1
30 TABLET, CHEWABLE ORAL
Refills: 0 | Status: DISCONTINUED | OUTPATIENT
Start: 2022-12-07 | End: 2022-12-12

## 2022-12-06 RX ORDER — MAGNESIUM SULFATE 500 MG/ML
4 VIAL (ML) INJECTION ONCE
Refills: 0 | Status: COMPLETED | OUTPATIENT
Start: 2022-12-06 | End: 2022-12-06

## 2022-12-06 RX ORDER — OXYCODONE HYDROCHLORIDE 5 MG/1
5 TABLET ORAL
Refills: 0 | Status: COMPLETED | OUTPATIENT
Start: 2022-12-07 | End: 2022-12-14

## 2022-12-06 RX ORDER — CITRIC ACID/SODIUM CITRATE 300-500 MG
30 SOLUTION, ORAL ORAL ONCE
Refills: 0 | Status: COMPLETED | OUTPATIENT
Start: 2022-12-06 | End: 2022-12-06

## 2022-12-06 RX ORDER — OXYCODONE HYDROCHLORIDE 5 MG/1
5 TABLET ORAL ONCE
Refills: 0 | Status: DISCONTINUED | OUTPATIENT
Start: 2022-12-07 | End: 2022-12-12

## 2022-12-06 RX ORDER — SODIUM CHLORIDE 9 MG/ML
1000 INJECTION, SOLUTION INTRAVENOUS ONCE
Refills: 0 | Status: COMPLETED | OUTPATIENT
Start: 2022-12-06 | End: 2022-12-06

## 2022-12-06 RX ORDER — SIMETHICONE 80 MG/1
80 TABLET, CHEWABLE ORAL EVERY 4 HOURS
Refills: 0 | Status: DISCONTINUED | OUTPATIENT
Start: 2022-12-07 | End: 2022-12-12

## 2022-12-06 RX ORDER — BUTORPHANOL TARTRATE 2 MG/ML
0.12 INJECTION, SOLUTION INTRAMUSCULAR; INTRAVENOUS EVERY 6 HOURS
Refills: 0 | Status: DISCONTINUED | OUTPATIENT
Start: 2022-12-06 | End: 2022-12-07

## 2022-12-06 RX ORDER — LABETALOL HCL 100 MG
400 TABLET ORAL ONCE
Refills: 0 | Status: COMPLETED | OUTPATIENT
Start: 2022-12-06 | End: 2022-12-06

## 2022-12-06 RX ORDER — IBUPROFEN 200 MG
600 TABLET ORAL EVERY 6 HOURS
Refills: 0 | Status: COMPLETED | OUTPATIENT
Start: 2022-12-07 | End: 2023-11-05

## 2022-12-06 RX ORDER — OXYCODONE HYDROCHLORIDE 5 MG/1
10 TABLET ORAL
Refills: 0 | Status: DISCONTINUED | OUTPATIENT
Start: 2022-12-06 | End: 2022-12-07

## 2022-12-06 RX ORDER — SODIUM CHLORIDE 9 MG/ML
1000 INJECTION, SOLUTION INTRAVENOUS
Refills: 0 | Status: DISCONTINUED | OUTPATIENT
Start: 2022-12-06 | End: 2022-12-06

## 2022-12-06 RX ORDER — ACETAMINOPHEN 500 MG
975 TABLET ORAL
Refills: 0 | Status: DISCONTINUED | OUTPATIENT
Start: 2022-12-07 | End: 2022-12-12

## 2022-12-06 RX ORDER — LABETALOL HCL 100 MG
800 TABLET ORAL THREE TIMES A DAY
Refills: 0 | Status: DISCONTINUED | OUTPATIENT
Start: 2022-12-07 | End: 2022-12-07

## 2022-12-06 RX ORDER — OXYCODONE HYDROCHLORIDE 5 MG/1
5 TABLET ORAL
Refills: 0 | Status: DISCONTINUED | OUTPATIENT
Start: 2022-12-06 | End: 2022-12-07

## 2022-12-06 RX ORDER — NALOXONE HYDROCHLORIDE 4 MG/.1ML
0.1 SPRAY NASAL
Refills: 0 | Status: DISCONTINUED | OUTPATIENT
Start: 2022-12-06 | End: 2022-12-07

## 2022-12-06 RX ORDER — NALBUPHINE HYDROCHLORIDE 10 MG/ML
2.5 INJECTION, SOLUTION INTRAMUSCULAR; INTRAVENOUS; SUBCUTANEOUS EVERY 6 HOURS
Refills: 0 | Status: DISCONTINUED | OUTPATIENT
Start: 2022-12-06 | End: 2022-12-07

## 2022-12-06 RX ORDER — TETANUS TOXOID, REDUCED DIPHTHERIA TOXOID AND ACELLULAR PERTUSSIS VACCINE, ADSORBED 5; 2.5; 8; 8; 2.5 [IU]/.5ML; [IU]/.5ML; UG/.5ML; UG/.5ML; UG/.5ML
0.5 SUSPENSION INTRAMUSCULAR ONCE
Refills: 0 | Status: DISCONTINUED | OUTPATIENT
Start: 2022-12-07 | End: 2022-12-12

## 2022-12-06 RX ORDER — ONDANSETRON 8 MG/1
4 TABLET, FILM COATED ORAL EVERY 6 HOURS
Refills: 0 | Status: DISCONTINUED | OUTPATIENT
Start: 2022-12-06 | End: 2022-12-07

## 2022-12-06 RX ORDER — SODIUM CHLORIDE 9 MG/ML
1000 INJECTION, SOLUTION INTRAVENOUS
Refills: 0 | Status: DISCONTINUED | OUTPATIENT
Start: 2022-12-06 | End: 2022-12-07

## 2022-12-06 RX ORDER — ALBUTEROL 90 UG/1
2 AEROSOL, METERED ORAL EVERY 6 HOURS
Refills: 0 | Status: DISCONTINUED | OUTPATIENT
Start: 2022-12-06 | End: 2022-12-07

## 2022-12-06 RX ORDER — MAGNESIUM SULFATE 500 MG/ML
2 VIAL (ML) INJECTION
Qty: 40 | Refills: 0 | Status: DISCONTINUED | OUTPATIENT
Start: 2022-12-06 | End: 2022-12-07

## 2022-12-06 RX ORDER — DIPHENHYDRAMINE HCL 50 MG
25 CAPSULE ORAL EVERY 6 HOURS
Refills: 0 | Status: DISCONTINUED | OUTPATIENT
Start: 2022-12-07 | End: 2022-12-12

## 2022-12-06 RX ADMIN — Medication 300 GRAM(S): at 16:57

## 2022-12-06 RX ADMIN — Medication 400 MILLIGRAM(S): at 16:02

## 2022-12-06 RX ADMIN — SODIUM CHLORIDE 2000 MILLILITER(S): 9 INJECTION, SOLUTION INTRAVENOUS at 19:41

## 2022-12-06 RX ADMIN — Medication 50 GM/HR: at 17:20

## 2022-12-06 RX ADMIN — Medication 30 MILLILITER(S): at 22:36

## 2022-12-06 RX ADMIN — SODIUM CHLORIDE 50 MILLILITER(S): 9 INJECTION, SOLUTION INTRAVENOUS at 17:02

## 2022-12-06 RX ADMIN — Medication 400 MILLIGRAM(S): at 16:51

## 2022-12-06 RX ADMIN — Medication 50 GM/HR: at 19:13

## 2022-12-06 RX ADMIN — FAMOTIDINE 20 MILLIGRAM(S): 10 INJECTION INTRAVENOUS at 22:36

## 2022-12-06 NOTE — OB RN TRIAGE NOTE - FALL HARM RISK - UNIVERSAL INTERVENTIONS
Bed in lowest position, wheels locked, appropriate side rails in place/Call bell, personal items and telephone in reach/Instruct patient to call for assistance before getting out of bed or chair/Non-slip footwear when patient is out of bed/[place] to call system/Physically safe environment - no spills, clutter or unnecessary equipment/Purposeful Proactive Rounding/Room/bathroom lighting operational, light cord in reach

## 2022-12-06 NOTE — OB PROVIDER H&P - NSHPPHYSICALEXAM_GEN_ALL_CORE
Vital Signs Last 24 Hrs  T(C): 37 (06 Dec 2022 14:52), Max: 37 (06 Dec 2022 14:52)  T(F): 98.6 (06 Dec 2022 14:52), Max: 98.6 (06 Dec 2022 14:52)  HR: 96 (06 Dec 2022 16:20) (88 - 105)  BP: 153/87 (06 Dec 2022 16:18) (140/90 - 177/90)  BP(mean): --  RR: 16 (06 Dec 2022 14:52) (16 - 16)  SpO2: 99% (06 Dec 2022 16:20) (97% - 99%)    Parameters below as of 06 Dec 2022 14:52  Patient On (Oxygen Delivery Method): room air    A&O x3  CTAb  abdomen: gravid, soft, nontender  SVE- deferred  NST: 160 baseline, moderate variability + accels, no decels, no contractions     TAS done today at [facility]- vtx post placenta

## 2022-12-06 NOTE — PROVIDER CONTACT NOTE (OTHER) - BACKGROUND
mag level due at 23:00, however pt in OR at that time.   As per MD soloff, RN sent mag level before around 22:00

## 2022-12-06 NOTE — OB PROVIDER H&P - HISTORY OF PRESENT ILLNESS
This ia a 37 year old  at 36.4 weeks gestational age presents from [facility] at [place] for evaluation to r/o PEC as Bps in ATU were 154/100 and 143/96 today. Pt denies headache, blurry vision, epigastric pain, nausea, vomiting or swelling more than usual. Pt reports mild SOB while walking and standing for long periods of time, denies SOB with rest, states this SOB has been for a few months. Reports +GFM, denies LOF, VB or contractions.   As per [facility] recommendation, pt to be scheduled for rpt c/s at 37 weeks, however pt states she has not received a booked date/ time yet.   AP course complicated by:  - CHTN, followed by Dr Olimpia Gloria,  on Labetalol 800 tid, dose increased on . Pt states she is not consistent with monitoring BPs at home, but states that when she does remember Bps are in the 130-150/-80-90 range with the occasional 160/90.   - Pt seen and evaluated at [facility] on  for r/o PEC, labs WNL, 24 hour urine collection not collected   -  FETAL ALERT 22: Fetal echo- (22) Limited views; The mitral valve morphology was not well visualized; no evidence of mitral stenosis or regurgitation. Trivial tricuspid valve regurgitation, likely normal variant. Non-urgent outpatient  cardiology follow-up recommended within ~ 3-4 weeks after delivery, or sooner, if there are any clinical cardiac concerns.    TAS today at [facility]: vtx, posterior placenta, bpp 8/, sandie 16.07 efw 6#8 52% 2948g    med: anemia  CHTN  Asthma, last used inhaler last week, denies hospitalizations or intubations   Surg: c/s x 3  GYN: hx of fibroids top x1  sab x1 complete  hx of chlamydia, HSV  OB:   2002 ft uncomplicated  7#5  10/20/2005 primary c/s at 32 weeks for PEC 4#4  2008  rpt c/s at 35 weeks for PEC 6#0  2012 rpt c/s at 32 weeks for PEC 3#7  current meds: Labetalol 800 mg tid ( 0700, 1500, 2300), last took today at 0730, pnv, baby Asa ( alternating 1 tab- 2 tab)  NKDA

## 2022-12-06 NOTE — OB PROVIDER TRIAGE NOTE - NSOBPROVIDERNOTE_OBGYN_ALL_OB_FT
This is a 37 year old  at 36.4 weeks admitted for sPEC and rpt c/s    PLan discussed with Dr Vargas  BP @1516 177/90  BP @ 1531 164/91  D/w dr Vargas, Dr Resendiz- Labetalol 800 mg at home afternoon dose to be given now  BP @ 1548 146/87  BP @ 1603 147/86  BP @ 1618 153/87  Huddle done with Dr Vargas on phone, Dr Resendiz, Dr Palleschi, [facility]  plan for rpt c/s at  when pt is NPO unless pts' BPs continue to be severe range  Magnesium sulfate for seizure precautions  Continue Labetalol 800 mg q 8 hours  2nd IV line inserted for risk of PPH  2 units packed RBC's on hold   Covid swab collected and sent  routine orders

## 2022-12-06 NOTE — OB PROVIDER TRIAGE NOTE - NSHPPHYSICALEXAM_GEN_ALL_CORE
Vital Signs Last 24 Hrs  T(C): 37 (06 Dec 2022 14:52), Max: 37 (06 Dec 2022 14:52)  T(F): 98.6 (06 Dec 2022 14:52), Max: 98.6 (06 Dec 2022 14:52)  HR: 96 (06 Dec 2022 16:20) (88 - 105)  BP: 153/87 (06 Dec 2022 16:18) (140/90 - 177/90)  BP(mean): --  RR: 16 (06 Dec 2022 14:52) (16 - 16)  SpO2: 99% (06 Dec 2022 16:20) (97% - 99%)    Parameters below as of 06 Dec 2022 14:52  Patient On (Oxygen Delivery Method): room air    A&O x3  CTAb  abdomen: gravid, soft, nontender  SVE- deferred  NST: 160 baseline, moderate variability + accels, no decels, no contractions     TAS done today at [facility] MFM- vtx post placenta

## 2022-12-06 NOTE — OB PROVIDER H&P - ASSESSMENT
This is a 37 year old  at 36.4 weeks admitted for sPEC and rpt c/s    PLan discussed with Dr Vargas  BP @1516 177/90  BP @ 1531 164/91  D/w dr Vargas, Dr Resendiz- Labetalol 800 mg at home afternoon dose to be given now  BP @ 1548 146/87  BP @ 1603 147/86  BP @ 1618 153/87  Huddle done with Dr Vargas on phone, Dr Resendiz, Dr Palleschi, [facility]  plan for rpt c/s at  when pt is NPO unless pts' BPs continue to be severe range  Magnesium sulfate for seizure precautions  Continue Labetalol 800 mg q 8 hours  2nd IV line inserted for risk of PPH  2 units packed RBC's on hold   Covid swab collected and sent  routine orders

## 2022-12-06 NOTE — PROVIDER CONTACT NOTE (OTHER) - SITUATION
Pt is cHTN getting repeat  for PEC.  mag bolus was started at 17:00 as per ordes  mag maintaince dose was started at 17:20 as per orders

## 2022-12-06 NOTE — OB RN PATIENT PROFILE - HEIGHT IN INCHES
[place] INPATIENT ENCOUNTER  CRITICAL CARE DAILY PROGRESS NOTE    ADMISSION DATE:  8/16/2020  DATE:  9/14/2020  CURRENT HOSPITAL DAY:  Hospital Day: 30  ATTENDING PHYSICIAN:  Liang Bustos MD  CODE STATUS:  Selective Treatment/DNR    IMPRESSION:     Acute hypoxemic and hypercapnic respiratory failure  Liver failure with ascites secondary to cirrhosis  Severe sepsis  Acute cholecystitis-status post IR cholecystostomy drain-polymicrobial cultures from bile  VIVIAN    Tried on pressure support again today with the patient reasonably awake but ultimately developed apnea     PLAN:     Tracheostomy tomorrow  IR to place feeding tube  Antibiotics per ID  Hold weaning trials  Fluid management per Nephrology         SUBJECTIVE:  Did have a nosebleed last night requiring a rhino rocket.  Clots were suctioned from endotracheal tube.  Periods of agitation.    MEDICATIONS:  SCHEDULED MEDS:  • famotidine (PEPCID) injection  20 mg Intravenous 2 times per day   • oxymetazoline  2 spray Left Nare Once   • meropenem (MERREM) IVPB  1 g Intravenous 3 times per day   • furosemide  40 mg Intravenous Daily   • sodium chloride (PF)  10 mL Injection 3 times per day   • sodium bicarbonate  650 mg Per NG tube 4x Daily   • [Held by provider] heparin (porcine)  2,500 Units Subcutaneous Q8H LITO   • Potassium Standard Replacement Protocol   Does not apply See Admin Instructions   • Magnesium Standard Replacement Protocol   Does not apply See Admin Instructions   • potassium CHLORIDE  20 mEq Oral Daily with breakfast   • lactulose  10 g Per NG tube Daily   • levothyroxine  50 mcg Per NG tube QAM AC   • chlorhexidine gluconate  15 mL Swish & Spit Q12H LITO   • Phosphorus Standard Replacement Protocol   Does not apply See Admin Instructions   • petrolatum (white)-mineral oil  1 application Both Eyes Q4H LITO   • rifAXIMin  550 mg Per NG tube Q12H LITO   • magnesium sulfate  1 g Intravenous Once   • folic acid  1 mg Per NG tube Daily   • sodium  chloride (PF)  2 mL Intracatheter Q12H LITO       CONTINUOUS INFUSIONS:  • sodium chloride 0.9% infusion     • dexMEDETomidine (PRECEDEX) 400 mcg/100 mL in sodium chloride 0.9 % infusion 1 mcg/kg/hr (09/14/20 0912)   • fentaNYL (SUBLIMAZE) 2,500 mcg/250 mL in sodium chloride 0.9 % infusion 125 mcg/hr (09/14/20 0913)   • MIDAZolam (VERSED) 100 mg in sodium chloride 0.9% 100 mL infusion Stopped (09/12/20 2008)       PRN MEDS:  LORazepam, sodium chloride, sodium chloride (PF), sodium chloride (PF), sodium chloride (PF), sodium chloride, polyethylene glycol, fentaNYL, MIDAZolam, fentaNYL, hydrALAZINE, sodium chloride, atropine, fentaNYL (SUBLIMAZE) infusion **AND** [DISCONTINUED] fentaNYL, MIDAZolam (VERSED) 100 mg/100 mL standard concentration infusion **AND** MIDAZolam, potassium phosphate/sodium phosphate, sodium phosphate IVPB, calcium gluconate IVPB, sodium chloride, ondansetron    HISTORIES:  I have reviewed the past medical history, family history, social history, medications and allergies listed in the medical record as well as the nursing notes for this encounter.    OBJECTIVE:  VITAL SIGNS:  Vital Last Value 24 Hour Range   Temperature 95.9 °F (35.5 °C) (09/14/20 0931) Temp  Min: 95.9 °F (35.5 °C)  Max: 97.7 °F (36.5 °C)   Pulse 83 (09/14/20 0931) Pulse  Min: 71  Max: 115   Respiratory (!) 22 (09/14/20 0931) Resp  Min: 21  Max: 27   Non-Invasive  Blood Pressure (!) 140/91 (09/14/20 0931) BP  Min: 80/57  Max: 178/111   Pulse Oximetry 100 % (09/14/20 0931) SpO2  Min: 99 %  Max: 100 %     Vital Today Admitted   Weight 55 kg (09/13/20 0412) Weight: 55 kg (08/16/20 1116)   Height N/A Height: 5' 9\" (175.3 cm) (08/16/20 1507)   BMI N/A BMI (Calculated): 17.58 (08/16/20 1507)     INTAKE/OUTPUT LAST 3 SHIFTS:  I/O last 3 completed shifts:  In: 2672.3 [I.V.:667.3; Blood:370; Other:60; NG/GT:1475; IV Piggyback:100]  Out: 2225 [Urine:2225]    INTAKE/OUTPUT THIS SHIFT:  I/O this shift:  In: -   Out: 750 [Urine:750]    VENT  SETTINGS LAST 24 HOURS:  FiO2 (%):  [30 %] 30 %  S RR:  [22] 22  S VT:  [450 mL] 450 mL  PEEP/CPAP/EPAP:  [5 cm H20] 5 cm H20    HEMODYNAMIC SETTINGS LAST 24 HOURS:       PHYSICAL EXAM:  General:  Awake but mildly lethargic  Skin:  Skin color, texture, and turgor normal.  No rashes or lesions.  Head:  Normocephalic, without obvious abnormality, atraumatic.  Eyes:  PERRL, conjunctivae/corneas clear.  Nose: Nares normal. Septum midline.  Throat:  Lips, mucosa, and tongue normal; teeth and gums normal.  Neck: Supple, symmetrical.  Trachea midline. No adenopathy.  Lungs:  Decreased breath sounds  Heart:: Regular rate and rhythm. S1 and S2 normal. No murmur, rub or gallop.  Abdomen:  Soft, distended  Extremities:  Normal, atraumatic, no cyanosis,  3+ edema.  Neuro:  Intubated.  Awake but mildly lethargic.    LABORATORY DATA:  Recent Labs   Lab 09/14/20  0737 09/14/20  0315 09/13/20  2032 09/13/20  0442 09/12/20  0509   WBC  --  9.3  --  9.9 10.3   HCT 22.3* 23.3* 18.9* 20.8* 22.4*   HGB 7.3* 7.7* 6.1* 7.0* 7.4*   PLT  --  173  --  217 213     Recent Labs   Lab 09/14/20  0314 09/13/20  0442 09/12/20  1217 09/12/20  0509   SODIUM 149* 149*  --  148*   POTASSIUM 4.4 4.0 4.7 3.6   CHLORIDE 124* 122*  --  120*   CO2 18* 19*  --  21   ANIONGAP 11 12  --  11   GLUCOSE 109* 113*  --  109*   BUN 39* 44*  --  48*   CREATININE 0.89 0.96  --  1.09   BCRAT 44* 46*  --  44*   CALCIUM 8.1* 7.9*  --  7.3*   BILIRUBIN 1.1* 1.3*  --  1.2*   AST 19 19  --  20   GPT 24 26  --  22   ALKPT 278* 309*  --  337*   TOTPROTEIN 6.1* 5.4*  --  5.2*   ALBUMIN 2.4* 2.4*  --  2.3*   GLOB 3.7 3.0  --  2.9   AGR 0.6* 0.8*  --  0.8*     Recent Labs   Lab 09/14/20  0314 09/13/20  0442 09/12/20  1217 09/12/20  0509   SODIUM 149* 149*  --  148*   POTASSIUM 4.4 4.0 4.7 3.6   CHLORIDE 124* 122*  --  120*   CO2 18* 19*  --  21   ANIONGAP 11 12  --  11   GLUCOSE 109* 113*  --  109*   BUN 39* 44*  --  48*   CREATININE 0.89 0.96  --  1.09   BCRAT 44* 46*  --  44*    CALCIUM 8.1* 7.9*  --  7.3*     Lab Results   Component Value Date    NTPROB 5,918 (H) 08/16/2020     Respiratory Culture and Smear   Gram Stain (no units)   Date Value   09/03/2020 Many Polymorphonuclear cells.   09/03/2020 Moderate Epithelial cells.   09/03/2020 Few Yeast.     No results found for: CULT   Anaerobic and Aerobic Culture and Smear   Gram Stain (no units)   Date Value   09/03/2020 Many Polymorphonuclear cells.   09/03/2020 Moderate Epithelial cells.   09/03/2020 Few Yeast.     No results found for: CULT         No results found for: SDES, URC, CULT, RPT      No results found for: BLOODCULTURE    IMAGING STUDIES:    Radiographic images have been reviewed by me personally.      Results for orders placed during the hospital encounter of 08/16/20   XR CHEST AP OR PA    Narrative AP PORTABLE VIEW CHEST, DATED 9/8/2020 at 1401 HOURS.    CLINICAL HISTORY:  Pneumonia and bilateral pleural effusions.    COMPARISONS: 9/5/2020.      Impression IMPRESSION: Endotracheal tube tip in good position. Right internal jugular  catheter tip in the superior vena cava. Nasogastric tube tip and sidehole  within the gastric lumen.    Airspace opacity throughout the left retrocardiac region obscuring the left  hemidiaphragm, unchanged. Faint airspace opacity also noted throughout the  right lower lung, unchanged. Small bilateral pleural effusions.    The cardiac and mediastinal contours appear unremarkable.     No pneumothorax.                                     Critical Care Time greater than: 35 minutes      Tin Holcomb MD         2

## 2022-12-06 NOTE — OB RN INTRAOPERATIVE NOTE - NSSELHIDDEN_OBGYN_ALL_OB_FT
[NS_DeliveryAttending1_OBGYN_ALL_OB_FT:[id number]],[NS_DeliveryRN_OBGYN_ALL_OB_FT:[id number]] [NS_DeliveryAttending1_OBGYN_ALL_OB_FT:[id number]],[NS_DeliveryRN_OBGYN_ALL_OB_FT:[id number]],[NS_DeliveryAssist1_OBGYN_ALL_OB_FT:[id number]]

## 2022-12-06 NOTE — OB RN TRIAGE NOTE - NS_OBGYNHISTORY_OBGYN_ALL_OB_FT
top x1  sab x1 complete  02  7-5  10/20/05 primary csection @ 35 wks PEC  08 rpt csection PEC FT  12 rpt csection @ 34 wks 3-7

## 2022-12-06 NOTE — OB PROVIDER TRIAGE NOTE - HISTORY OF PRESENT ILLNESS
This ia a 37 year old  at 36.4 weeks gestational age presents from [facility] at [place] for evaluation to r/o PEC as Bps in ATU were 154/100 and 143/96 today. Pt denies headache, blurry vision, epigastric pain, nausea, vomiting or swelling more than usual. Pt reports mild SOB while walking and standing for long periods of time, denies SOB with rest, states this SOB has been for a few months. Reports +GFM, denies LOF, VB or contractions.   As per [facility] recommendation, pt to be scheduled for rpt c/s at 37 weeks, however pt states she has not received a booked date/ time yet.   AP course complicated by:  - CHTN, followed by Dr Olimpia Gloria,  on Labetalol 800 tid, dose increased on . Pt states she is not consistent with monitoring BPs at home, but states that when she does remember Bps are in the 130-150/-80-90 range with the occasional 160/90.   - Pt seen and evaluated at [facility] on  for r/o PEC, labs WNL, 24 hour urine collection not collected   -  FETAL ALERT 22: Fetal echo- (22) Limited views; The mitral valve morphology was not well visualized; no evidence of mitral stenosis or regurgitation. Trivial tricuspid valve regurgitation, likely normal variant. Non-urgent outpatient  cardiology follow-up recommended within ~ 3-4 weeks after delivery, or sooner, if there are any clinical cardiac concerns.    TAS today at [facility]: vtx, posterior placenta, bpp 8/, sandie 16.07 efw 6#8 52% 2948g    med: anemia  CHTN  Asthma, last used inhaler last week, denies hospitalizations or intubations   Surg: c/s x 3  GYN: hx of fibroids top x1  sab x1 complete  hx of chlamydia, HSV  OB:   2002 ft uncomplicated  7#5  10/20/2005 primary c/s at 32 weeks for PEC 4#4  2008  rpt c/s at 35 weeks for PEC 6#0  2012 rpt c/s at 32 weeks for PEC 3#7  current meds: Labetalol 800 mg tid ( 0700, 1500, 2300), last took today at 0730, pnv, baby Asa ( alternating 1 tab- 2 tab)  NKDA

## 2022-12-06 NOTE — OB RN TRIAGE NOTE - NSMATERNALFETALCONCERNS_OBGYN_ALL_OB_FT
Fetal Alert  11.8.22: Fetal echo- (22) Limited views;The mitral valve morphology was not well visualized; no evidence of mitral stenosis or regurgitation.Trivial tricuspid valve regurgitation, likely normal variant.Non-urgent outpatient  cardiology follow-up recommended within ~ 3-4 weeks after delivery, or sooner, if there are any clinical cardiac concerns. Elsy Espinoza RN.

## 2022-12-06 NOTE — OB RN INTRAOPERATIVE NOTE - NS_PACKS_OBGYN_ALL_OB
Quality 110: Preventive Care And Screening: Influenza Immunization: Influenza immunization was not ordered or administered, reason not given Quality 431: Preventive Care And Screening: Unhealthy Alcohol Use - Screening: Documentation of medical reason(s) for not screening for unhealthy alcohol use (e.g., limited life expectancy, other medical reasons) Detail Level: Detailed Quality 226: Preventive Care And Screening: Tobacco Use: Screening And Cessation Intervention: Patient screened for tobacco use and is an ex/non-smoker Quality 130: Documentation Of Current Medications In The Medical Record: Current Medications Documented None

## 2022-12-07 LAB
HCT VFR BLD CALC: 34.7 % — SIGNIFICANT CHANGE UP (ref 34.5–45)
HCT VFR BLD CALC: 38.5 % — SIGNIFICANT CHANGE UP (ref 34.5–45)
HGB BLD-MCNC: 11.2 G/DL — LOW (ref 11.5–15.5)
HGB BLD-MCNC: 11.7 G/DL — SIGNIFICANT CHANGE UP (ref 11.5–15.5)
MAGNESIUM SERPL-MCNC: 3.8 MG/DL — HIGH (ref 1.6–2.6)
MAGNESIUM SERPL-MCNC: 4 MG/DL — HIGH (ref 1.6–2.6)
MAGNESIUM SERPL-MCNC: 4 MG/DL — HIGH (ref 1.6–2.6)
MCHC RBC-ENTMCNC: 24.9 PG — LOW (ref 27–34)
MCHC RBC-ENTMCNC: 25.7 PG — LOW (ref 27–34)
MCHC RBC-ENTMCNC: 30.4 GM/DL — LOW (ref 32–36)
MCHC RBC-ENTMCNC: 32.3 GM/DL — SIGNIFICANT CHANGE UP (ref 32–36)
MCV RBC AUTO: 79.8 FL — LOW (ref 80–100)
MCV RBC AUTO: 82.1 FL — SIGNIFICANT CHANGE UP (ref 80–100)
NRBC # BLD: 0 /100 WBCS — SIGNIFICANT CHANGE UP (ref 0–0)
NRBC # BLD: 0 /100 WBCS — SIGNIFICANT CHANGE UP (ref 0–0)
NRBC # FLD: 0 K/UL — SIGNIFICANT CHANGE UP (ref 0–0)
NRBC # FLD: 0 K/UL — SIGNIFICANT CHANGE UP (ref 0–0)
PLATELET # BLD AUTO: 164 K/UL — SIGNIFICANT CHANGE UP (ref 150–400)
PLATELET # BLD AUTO: 167 K/UL — SIGNIFICANT CHANGE UP (ref 150–400)
RBC # BLD: 4.35 M/UL — SIGNIFICANT CHANGE UP (ref 3.8–5.2)
RBC # BLD: 4.69 M/UL — SIGNIFICANT CHANGE UP (ref 3.8–5.2)
RBC # FLD: 19.2 % — HIGH (ref 10.3–14.5)
RBC # FLD: 19.2 % — HIGH (ref 10.3–14.5)
T PALLIDUM AB TITR SER: NEGATIVE — SIGNIFICANT CHANGE UP
WBC # BLD: 9.09 K/UL — SIGNIFICANT CHANGE UP (ref 3.8–10.5)
WBC # BLD: 9.17 K/UL — SIGNIFICANT CHANGE UP (ref 3.8–10.5)
WBC # FLD AUTO: 9.09 K/UL — SIGNIFICANT CHANGE UP (ref 3.8–10.5)
WBC # FLD AUTO: 9.17 K/UL — SIGNIFICANT CHANGE UP (ref 3.8–10.5)

## 2022-12-07 PROCEDURE — 88307 TISSUE EXAM BY PATHOLOGIST: CPT | Mod: 26

## 2022-12-07 RX ORDER — HEPARIN SODIUM 5000 [USP'U]/ML
5000 INJECTION INTRAVENOUS; SUBCUTANEOUS EVERY 12 HOURS
Refills: 0 | Status: DISCONTINUED | OUTPATIENT
Start: 2022-12-07 | End: 2022-12-12

## 2022-12-07 RX ORDER — LABETALOL HCL 100 MG
800 TABLET ORAL THREE TIMES A DAY
Refills: 0 | Status: DISCONTINUED | OUTPATIENT
Start: 2022-12-07 | End: 2022-12-08

## 2022-12-07 RX ORDER — SODIUM CHLORIDE 9 MG/ML
1000 INJECTION, SOLUTION INTRAVENOUS
Refills: 0 | Status: DISCONTINUED | OUTPATIENT
Start: 2022-12-07 | End: 2022-12-08

## 2022-12-07 RX ORDER — MAGNESIUM SULFATE 500 MG/ML
2 VIAL (ML) INJECTION
Qty: 40 | Refills: 0 | Status: DISCONTINUED | OUTPATIENT
Start: 2022-12-07 | End: 2022-12-08

## 2022-12-07 RX ORDER — SODIUM CHLORIDE 9 MG/ML
1000 INJECTION, SOLUTION INTRAVENOUS
Refills: 0 | Status: DISCONTINUED | OUTPATIENT
Start: 2022-12-07 | End: 2022-12-07

## 2022-12-07 RX ORDER — OXYCODONE HYDROCHLORIDE 5 MG/1
5 TABLET ORAL
Refills: 0 | Status: DISCONTINUED | OUTPATIENT
Start: 2022-12-07 | End: 2022-12-12

## 2022-12-07 RX ORDER — ALBUTEROL 90 UG/1
2 AEROSOL, METERED ORAL EVERY 6 HOURS
Refills: 0 | Status: DISCONTINUED | OUTPATIENT
Start: 2022-12-07 | End: 2022-12-12

## 2022-12-07 RX ORDER — LABETALOL HCL 100 MG
800 TABLET ORAL THREE TIMES A DAY
Refills: 0 | Status: DISCONTINUED | OUTPATIENT
Start: 2022-12-07 | End: 2022-12-07

## 2022-12-07 RX ORDER — OXYTOCIN 10 UNIT/ML
16.67 VIAL (ML) INJECTION
Qty: 20 | Refills: 0 | Status: DISCONTINUED | OUTPATIENT
Start: 2022-12-07 | End: 2022-12-07

## 2022-12-07 RX ORDER — OXYTOCIN 10 UNIT/ML
333.33 VIAL (ML) INJECTION
Qty: 20 | Refills: 0 | Status: DISCONTINUED | OUTPATIENT
Start: 2022-12-07 | End: 2022-12-07

## 2022-12-07 RX ORDER — DIPHENOXYLATE HCL/ATROPINE 2.5-.025MG
2 TABLET ORAL ONCE
Refills: 0 | Status: DISCONTINUED | OUTPATIENT
Start: 2022-12-07 | End: 2022-12-07

## 2022-12-07 RX ADMIN — Medication 50 GM/HR: at 07:22

## 2022-12-07 RX ADMIN — Medication 975 MILLIGRAM(S): at 11:29

## 2022-12-07 RX ADMIN — HEPARIN SODIUM 5000 UNIT(S): 5000 INJECTION INTRAVENOUS; SUBCUTANEOUS at 11:28

## 2022-12-07 RX ADMIN — Medication 30 MILLIGRAM(S): at 16:30

## 2022-12-07 RX ADMIN — Medication 975 MILLIGRAM(S): at 18:15

## 2022-12-07 RX ADMIN — Medication 50 GM/HR: at 19:01

## 2022-12-07 RX ADMIN — OXYCODONE HYDROCHLORIDE 5 MILLIGRAM(S): 5 TABLET ORAL at 20:24

## 2022-12-07 RX ADMIN — Medication 30 MILLIGRAM(S): at 11:00

## 2022-12-07 RX ADMIN — Medication 30 MILLIGRAM(S): at 22:00

## 2022-12-07 RX ADMIN — HEPARIN SODIUM 5000 UNIT(S): 5000 INJECTION INTRAVENOUS; SUBCUTANEOUS at 22:00

## 2022-12-07 RX ADMIN — Medication 1000 MILLIUNIT(S)/MIN: at 04:09

## 2022-12-07 RX ADMIN — Medication 975 MILLIGRAM(S): at 05:15

## 2022-12-07 RX ADMIN — Medication 30 MILLIGRAM(S): at 10:36

## 2022-12-07 RX ADMIN — Medication 975 MILLIGRAM(S): at 12:30

## 2022-12-07 RX ADMIN — SODIUM CHLORIDE 50 MILLILITER(S): 9 INJECTION, SOLUTION INTRAVENOUS at 19:25

## 2022-12-07 RX ADMIN — OXYCODONE HYDROCHLORIDE 5 MILLIGRAM(S): 5 TABLET ORAL at 21:24

## 2022-12-07 RX ADMIN — Medication 30 MILLIGRAM(S): at 22:15

## 2022-12-07 RX ADMIN — Medication 50 GM/HR: at 09:42

## 2022-12-07 RX ADMIN — Medication 975 MILLIGRAM(S): at 17:23

## 2022-12-07 RX ADMIN — MAGNESIUM HYDROXIDE 30 MILLILITER(S): 400 TABLET, CHEWABLE ORAL at 20:57

## 2022-12-07 RX ADMIN — Medication 30 MILLIGRAM(S): at 16:16

## 2022-12-07 RX ADMIN — Medication 50 GM/HR: at 04:10

## 2022-12-07 RX ADMIN — SIMETHICONE 80 MILLIGRAM(S): 80 TABLET, CHEWABLE ORAL at 20:57

## 2022-12-07 RX ADMIN — Medication 975 MILLIGRAM(S): at 05:41

## 2022-12-07 NOTE — OB PROVIDER DELIVERY SUMMARY - NSSELHIDDEN_OBGYN_ALL_OB_FT
[NS_DeliveryAttending1_OBGYN_ALL_OB_FT:[id number]=],[NS_DeliveryRN_OBGYN_ALL_OB_FT:[id number]=]

## 2022-12-07 NOTE — OB PROVIDER DELIVERY SUMMARY - NSPROVIDERDELIVERYNOTE_OBGYN_ALL_OB_FT
Uterus densely adherent to the anterior abdominal wall.     Upon separation of rectus muscles, approximately 3x2cm area of vertical uterine dehiscence noted in the med segment of the uterus. Amniotic sac noted to protrude through, no evidence of active bleeding.     Viable female infant, vertex position.   APGARs 1/5/7. Weight 2830g.     Bladder backfilled with 300cc methylene blue and noted to be both intact and away from the hysterotomy site.   Surgicel powder and intercede placed over the hysterotomy.     Patient received: IM Hemabate, TXA and 40u IV Pitocin.     QBL: 1009  IVF: 2500  UOP: 1100    Dictation #: [id number]    Opal Miller MD, PGY4

## 2022-12-07 NOTE — OB RN DELIVERY SUMMARY - NSSELHIDDEN_OBGYN_ALL_OB_FT
[NS_DeliveryAttending1_OBGYN_ALL_OB_FT:[id number]],[NS_DeliveryRN_OBGYN_ALL_OB_FT:[id number]] [NS_DeliveryAttending1_OBGYN_ALL_OB_FT:[id number]=],[NS_DeliveryRN_OBGYN_ALL_OB_FT:[id number]=],[NS_DeliveryAssist1_OBGYN_ALL_OB_FT:[id number]=]

## 2022-12-07 NOTE — OB RN DELIVERY SUMMARY - NS_CORDBLDTYPEA_OBGYN_ALL_OB
No nausea/vomit, difficulty chewing /swallow, diarrhea/constipation noted  Last BM : yesterday  NKFA  Ht: 62"  Ht taken from pt  Dosing ht: 157.5cm  Dosing wt: 40.8kg  Ht used for BMI 62"  Wt used for BMI 40.8kg  Education Provided : N/A  pressure injury: none
No

## 2022-12-07 NOTE — OB RN DELIVERY SUMMARY - NS_SEPSISRSKCALC_OBGYN_ALL_OB_FT
EOS calculated successfully. EOS Risk Factor: 0.5/1000 live births ([facility] national incidence); GA=36w5d; Temp=98.6; ROM=0; GBS='Unknown'; Antibiotics='No antibiotics or any antibiotics < 2 hrs prior to birth'

## 2022-12-07 NOTE — OB NEONATOLOGY/PEDIATRICIAN DELIVERY SUMMARY - NSPEDSNEONOTESA_OBGYN_ALL_OB_FT
Peds called to OR for fetal alert of tricuspid regurgitation,  delivery, category 2 tracings and uterine rupture. 36+5 wk female born via repeat CS to a 35 y/o  mother.  Maternal history of chronic hypertension on labetolol and Magnesium and Asthma. Possible prior hx of anemia, HSV, and chlamydia. Prenatal history of fetal alert for tricuspid valve regurgitation. Maternal labs include Blood Type B+  , HIV - , RPR NR , Rubella I , Hep B - , GBS unknown, COVID -. ROM at delivery. Noted to have a ruptured uterus during . Baby was warmed, dried suctioned and stimulated with APGARS of 1/5/7. Resuscitation included: CPAP at 30 seconds. PPV started at 1MOL for HR <100. Patient max settings of 5/100% Oxygen. HR <60 and chest compressions started at 1.5 MOL. Code 100 called. Around 2 MOL chest compressions stopped. PPV continued until 14 MOL.  CPAP continued until 18 MOL. Decision made to admit patient to NICU for monitoring. Mom plans to initiate breastfeeding, formula feed, consents Hep B vaccine. Highest maternal temp: 36.9 . EOS 0.07 . Peds called to OR for fetal alert of tricuspid regurgitation,  delivery, category 2 tracings and uterine rupture. 36+5 wk female born via repeat CS to a 35 y/o  mother.  Maternal history of chronic hypertension /pre-eclampsia on labetolol and Magnesium and Asthma. Possible prior hx of anemia, HSV, and chlamydia. Prenatal history of fetal alert for tricuspid valve regurgitation. Maternal labs include Blood Type B+  , HIV - , RPR NR , Rubella I , Hep B - , GBS unknown, COVID -. ROM at delivery. Noted to have a uterine window, no significant hemorrhage, during . Baby was warmed, dried suctioned and stimulated with APGARS of 1//7. Resuscitation included: CPAP at 30 seconds. PPV started at 1MOL for HR <100. Patient max settings of 22/5, 100% Oxygen. HR <60 and chest compressions started at 1.5 MOL, reportedly was given for ~15 seconds. Code 100 called. NICU team arrived at ~3.5 minutes of life (MOL). The infant appeared limp, cyanotic, without spontaneous respiratory effort, HR~60 bpm.  Effectiveness of PPV was optimized by suctioning, positioning, afterwards the infant's overall appearance, tone, O2Sat and respiratory effort improved. HR overall increased to > 120 bpm by 5.5 MOL. By 8 minutes of life O2Sat was >95% on 50% O2, CPAP 5 cmH2O with intermittent PPV when respiratory effort was inconsistent and HR at 100 bpm. PPV discontinued by 14 MOL. Since then the infant has had O2Sat > 95% in room air, HR > 100 bpm. CPAP continued until 18 MOL. Decision made to admit patient to NICU for monitoring. Mom plans to initiate breastfeeding, formula feed, consents Hep B vaccine. Highest maternal temp: 36.9 . EOS 0.07 .

## 2022-12-08 LAB
ALBUMIN SERPL ELPH-MCNC: 2.6 G/DL — LOW (ref 3.3–5)
ALP SERPL-CCNC: 91 U/L — SIGNIFICANT CHANGE UP (ref 40–120)
ALT FLD-CCNC: 14 U/L — SIGNIFICANT CHANGE UP (ref 4–33)
ANION GAP SERPL CALC-SCNC: 12 MMOL/L — SIGNIFICANT CHANGE UP (ref 7–14)
APTT BLD: 24.3 SEC — LOW (ref 27–36.3)
AST SERPL-CCNC: 23 U/L — SIGNIFICANT CHANGE UP (ref 4–32)
BASOPHILS # BLD AUTO: 0.01 K/UL — SIGNIFICANT CHANGE UP (ref 0–0.2)
BASOPHILS NFR BLD AUTO: 0.2 % — SIGNIFICANT CHANGE UP (ref 0–2)
BILIRUB SERPL-MCNC: <0.2 MG/DL — SIGNIFICANT CHANGE UP (ref 0.2–1.2)
BUN SERPL-MCNC: 5 MG/DL — LOW (ref 7–23)
CALCIUM SERPL-MCNC: 8.2 MG/DL — LOW (ref 8.4–10.5)
CHLORIDE SERPL-SCNC: 102 MMOL/L — SIGNIFICANT CHANGE UP (ref 98–107)
CO2 SERPL-SCNC: 22 MMOL/L — SIGNIFICANT CHANGE UP (ref 22–31)
CREAT SERPL-MCNC: 0.59 MG/DL — SIGNIFICANT CHANGE UP (ref 0.5–1.3)
EGFR: 120 ML/MIN/1.73M2 — SIGNIFICANT CHANGE UP
EOSINOPHIL # BLD AUTO: 0.06 K/UL — SIGNIFICANT CHANGE UP (ref 0–0.5)
EOSINOPHIL NFR BLD AUTO: 0.9 % — SIGNIFICANT CHANGE UP (ref 0–6)
FIBRINOGEN PPP-MCNC: 594 MG/DL — HIGH (ref 200–465)
GLUCOSE SERPL-MCNC: 116 MG/DL — HIGH (ref 70–99)
HCT VFR BLD CALC: 35.4 % — SIGNIFICANT CHANGE UP (ref 34.5–45)
HGB BLD-MCNC: 10.9 G/DL — LOW (ref 11.5–15.5)
IANC: 4.36 K/UL — SIGNIFICANT CHANGE UP (ref 1.8–7.4)
IMM GRANULOCYTES NFR BLD AUTO: 0.5 % — SIGNIFICANT CHANGE UP (ref 0–0.9)
INR BLD: 0.91 RATIO — SIGNIFICANT CHANGE UP (ref 0.88–1.16)
LDH SERPL L TO P-CCNC: 249 U/L — HIGH (ref 135–225)
LYMPHOCYTES # BLD AUTO: 1.45 K/UL — SIGNIFICANT CHANGE UP (ref 1–3.3)
LYMPHOCYTES # BLD AUTO: 22.9 % — SIGNIFICANT CHANGE UP (ref 13–44)
MCHC RBC-ENTMCNC: 25.3 PG — LOW (ref 27–34)
MCHC RBC-ENTMCNC: 30.8 GM/DL — LOW (ref 32–36)
MCV RBC AUTO: 82.1 FL — SIGNIFICANT CHANGE UP (ref 80–100)
MONOCYTES # BLD AUTO: 0.43 K/UL — SIGNIFICANT CHANGE UP (ref 0–0.9)
MONOCYTES NFR BLD AUTO: 6.8 % — SIGNIFICANT CHANGE UP (ref 2–14)
NEUTROPHILS # BLD AUTO: 4.36 K/UL — SIGNIFICANT CHANGE UP (ref 1.8–7.4)
NEUTROPHILS NFR BLD AUTO: 68.7 % — SIGNIFICANT CHANGE UP (ref 43–77)
NRBC # BLD: 0 /100 WBCS — SIGNIFICANT CHANGE UP (ref 0–0)
NRBC # FLD: 0 K/UL — SIGNIFICANT CHANGE UP (ref 0–0)
PLATELET # BLD AUTO: 165 K/UL — SIGNIFICANT CHANGE UP (ref 150–400)
POTASSIUM SERPL-MCNC: 4.2 MMOL/L — SIGNIFICANT CHANGE UP (ref 3.5–5.3)
POTASSIUM SERPL-SCNC: 4.2 MMOL/L — SIGNIFICANT CHANGE UP (ref 3.5–5.3)
PROT SERPL-MCNC: 6.1 G/DL — SIGNIFICANT CHANGE UP (ref 6–8.3)
PROTHROM AB SERPL-ACNC: 10.6 SEC — SIGNIFICANT CHANGE UP (ref 10.5–13.4)
RBC # BLD: 4.31 M/UL — SIGNIFICANT CHANGE UP (ref 3.8–5.2)
RBC # FLD: 19.9 % — HIGH (ref 10.3–14.5)
SODIUM SERPL-SCNC: 136 MMOL/L — SIGNIFICANT CHANGE UP (ref 135–145)
URATE SERPL-MCNC: 4.7 MG/DL — SIGNIFICANT CHANGE UP (ref 2.5–7)
WBC # BLD: 6.34 K/UL — SIGNIFICANT CHANGE UP (ref 3.8–10.5)
WBC # FLD AUTO: 6.34 K/UL — SIGNIFICANT CHANGE UP (ref 3.8–10.5)

## 2022-12-08 RX ORDER — IBUPROFEN 200 MG
600 TABLET ORAL EVERY 6 HOURS
Refills: 0 | Status: DISCONTINUED | OUTPATIENT
Start: 2022-12-08 | End: 2022-12-12

## 2022-12-08 RX ADMIN — Medication 600 MILLIGRAM(S): at 16:03

## 2022-12-08 RX ADMIN — OXYCODONE HYDROCHLORIDE 5 MILLIGRAM(S): 5 TABLET ORAL at 03:32

## 2022-12-08 RX ADMIN — OXYCODONE HYDROCHLORIDE 5 MILLIGRAM(S): 5 TABLET ORAL at 15:00

## 2022-12-08 RX ADMIN — OXYCODONE HYDROCHLORIDE 5 MILLIGRAM(S): 5 TABLET ORAL at 11:48

## 2022-12-08 RX ADMIN — SIMETHICONE 80 MILLIGRAM(S): 80 TABLET, CHEWABLE ORAL at 22:19

## 2022-12-08 RX ADMIN — Medication 600 MILLIGRAM(S): at 10:55

## 2022-12-08 RX ADMIN — OXYCODONE HYDROCHLORIDE 5 MILLIGRAM(S): 5 TABLET ORAL at 12:45

## 2022-12-08 RX ADMIN — MAGNESIUM HYDROXIDE 30 MILLILITER(S): 400 TABLET, CHEWABLE ORAL at 09:58

## 2022-12-08 RX ADMIN — OXYCODONE HYDROCHLORIDE 5 MILLIGRAM(S): 5 TABLET ORAL at 16:00

## 2022-12-08 RX ADMIN — OXYCODONE HYDROCHLORIDE 5 MILLIGRAM(S): 5 TABLET ORAL at 19:00

## 2022-12-08 RX ADMIN — Medication 600 MILLIGRAM(S): at 04:02

## 2022-12-08 RX ADMIN — OXYCODONE HYDROCHLORIDE 5 MILLIGRAM(S): 5 TABLET ORAL at 18:16

## 2022-12-08 RX ADMIN — Medication 600 MILLIGRAM(S): at 22:19

## 2022-12-08 RX ADMIN — Medication 600 MILLIGRAM(S): at 23:00

## 2022-12-08 RX ADMIN — OXYCODONE HYDROCHLORIDE 5 MILLIGRAM(S): 5 TABLET ORAL at 07:24

## 2022-12-08 RX ADMIN — OXYCODONE HYDROCHLORIDE 5 MILLIGRAM(S): 5 TABLET ORAL at 08:15

## 2022-12-08 RX ADMIN — Medication 975 MILLIGRAM(S): at 00:50

## 2022-12-08 RX ADMIN — HEPARIN SODIUM 5000 UNIT(S): 5000 INJECTION INTRAVENOUS; SUBCUTANEOUS at 22:19

## 2022-12-08 RX ADMIN — HEPARIN SODIUM 5000 UNIT(S): 5000 INJECTION INTRAVENOUS; SUBCUTANEOUS at 10:09

## 2022-12-08 RX ADMIN — Medication 600 MILLIGRAM(S): at 17:00

## 2022-12-08 RX ADMIN — Medication 975 MILLIGRAM(S): at 07:00

## 2022-12-08 RX ADMIN — MAGNESIUM HYDROXIDE 30 MILLILITER(S): 400 TABLET, CHEWABLE ORAL at 22:19

## 2022-12-08 RX ADMIN — SIMETHICONE 80 MILLIGRAM(S): 80 TABLET, CHEWABLE ORAL at 03:04

## 2022-12-08 RX ADMIN — Medication 975 MILLIGRAM(S): at 12:45

## 2022-12-08 RX ADMIN — Medication 600 MILLIGRAM(S): at 05:00

## 2022-12-08 RX ADMIN — Medication 975 MILLIGRAM(S): at 11:47

## 2022-12-08 RX ADMIN — Medication 975 MILLIGRAM(S): at 06:18

## 2022-12-08 RX ADMIN — Medication 600 MILLIGRAM(S): at 09:58

## 2022-12-08 RX ADMIN — Medication 975 MILLIGRAM(S): at 19:00

## 2022-12-08 RX ADMIN — Medication 975 MILLIGRAM(S): at 00:02

## 2022-12-08 RX ADMIN — OXYCODONE HYDROCHLORIDE 5 MILLIGRAM(S): 5 TABLET ORAL at 22:25

## 2022-12-08 RX ADMIN — Medication 975 MILLIGRAM(S): at 18:11

## 2022-12-08 RX ADMIN — OXYCODONE HYDROCHLORIDE 5 MILLIGRAM(S): 5 TABLET ORAL at 03:03

## 2022-12-08 RX ADMIN — OXYCODONE HYDROCHLORIDE 5 MILLIGRAM(S): 5 TABLET ORAL at 23:00

## 2022-12-08 RX ADMIN — Medication 800 MILLIGRAM(S): at 06:21

## 2022-12-08 NOTE — PROGRESS NOTE ADULT - SUBJECTIVE AND OBJECTIVE BOX
OB Progress Note:  Delivery, POD#1    S: 38yo POD#1 s/p LTCS . Her pain is well controlled. She is tolerating a regular diet and passing flatus. Denies N/V. Denies CP/SOB/lightheadedness/dizziness. Pt denies sxs of PEC: denies headache, visual changes, RUQ pain, respiratory distress  She is ambulating without difficulty.   Voiding spontaneously.     O:   Vital Signs Last 24 Hrs  T(C): 36.8 (08 Dec 2022 09:49), Max: 36.9 (07 Dec 2022 22:00)  T(F): 98.2 (08 Dec 2022 09:49), Max: 98.5 (08 Dec 2022 02:24)  HR: 99 (08 Dec 2022 09:49) (86 - 102)  BP: 105/65 (08 Dec 2022 09:49) (99/65 - 132/87)  BP(mean): --  RR: 17 (08 Dec 2022 09:49) (16 - 18)  SpO2: 98% (08 Dec 2022 09:49) (96% - 100%)    Parameters below as of 08 Dec 2022 06:15  Patient On (Oxygen Delivery Method): room air        Labs:  Blood type: B Positive  Rubella IgG: RPR: Negative                          10.9<L>   6.34 >-----------< 165    (  @ 06:40 )             35.4                        11.2<L>   9.17 >-----------< 164    (  @ 10:00 )             34.7                        11.7   9.09 >-----------< 167    (  @ 06:46 )             38.5                        12.5   4.02 >-----------< 184    (  @ 15:37 )             40.2    22 06:40      136  |  102  |  5<L>  ----------------------------<  116<H>  4.2   |  22  |  0.59    22 15:37      135  |  102  |  6<L>  ----------------------------<  150<H>  4.1   |  21<L>  |  0.51        Ca    8.2<L>      08 Dec 2022 06:40  Ca    9.2      06 Dec 2022 15:37  Mg     4.00<H>     12-07  Mg     4.00<H>     12  Mg     3.80<H>     12-07  Mg     3.80<H>     1206    TPro  6.1  /  Alb  2.6<L>  /  TBili  <0.2  /  DBili  x   /  AST  23  /  ALT  14  /  AlkPhos  91  22 @ 06:40  TPro  6.4  /  Alb  3.3  /  TBili  0.2  /  DBili  x   /  AST  21  /  ALT  14  /  AlkPhos  106  22 @ 15:37          PE:  General: NAD  Abdomen: Mildly distended, appropriately tender, incision with 2cm skin separation, Prineo below incision  Extremities: No erythema, no pitting edema

## 2022-12-08 NOTE — PROGRESS NOTE ADULT - ASSESSMENT
A/P: 38yo POD#1 s/p LTCS with siPEC.  Patient is stable and doing well post-operatively.    #siPEC  - Labetalol 800 held due to low BPs  - HELLP wnl    #PP  - Continue regular diet.  - Increase ambulation.  - Continue motrin, tylenol, oxycodone PRN for pain control.  - Hct stable  - For dermabond on incision    Mitra Freire MD  OB/GYN PGY-1

## 2022-12-08 NOTE — CHART NOTE - NSCHARTNOTEFT_GEN_A_CORE
R1 CHART NOTE    Offered patient Dermabond for skin separation for incision. Counseled the patient that she may feel a burning sensation. She declined because she wasn't feeling up to it at the time.    Mitra Freire, PGY1  d/w Dr. Vargas

## 2022-12-09 ENCOUNTER — APPOINTMENT (OUTPATIENT)
Dept: ANTEPARTUM | Facility: CLINIC | Age: 37
End: 2022-12-09

## 2022-12-09 RX ORDER — KETOROLAC TROMETHAMINE 30 MG/ML
30 SYRINGE (ML) INJECTION ONCE
Refills: 0 | Status: DISCONTINUED | OUTPATIENT
Start: 2022-12-09 | End: 2022-12-09

## 2022-12-09 RX ADMIN — HEPARIN SODIUM 5000 UNIT(S): 5000 INJECTION INTRAVENOUS; SUBCUTANEOUS at 10:33

## 2022-12-09 RX ADMIN — Medication 975 MILLIGRAM(S): at 06:11

## 2022-12-09 RX ADMIN — Medication 975 MILLIGRAM(S): at 00:05

## 2022-12-09 RX ADMIN — HEPARIN SODIUM 5000 UNIT(S): 5000 INJECTION INTRAVENOUS; SUBCUTANEOUS at 22:51

## 2022-12-09 RX ADMIN — Medication 975 MILLIGRAM(S): at 20:55

## 2022-12-09 RX ADMIN — Medication 600 MILLIGRAM(S): at 17:59

## 2022-12-09 RX ADMIN — OXYCODONE HYDROCHLORIDE 5 MILLIGRAM(S): 5 TABLET ORAL at 09:40

## 2022-12-09 RX ADMIN — Medication 975 MILLIGRAM(S): at 14:01

## 2022-12-09 RX ADMIN — Medication 30 MILLIGRAM(S): at 10:33

## 2022-12-09 RX ADMIN — Medication 600 MILLIGRAM(S): at 22:48

## 2022-12-09 RX ADMIN — Medication 975 MILLIGRAM(S): at 13:01

## 2022-12-09 RX ADMIN — OXYCODONE HYDROCHLORIDE 5 MILLIGRAM(S): 5 TABLET ORAL at 08:40

## 2022-12-09 RX ADMIN — Medication 30 MILLIGRAM(S): at 11:33

## 2022-12-09 RX ADMIN — Medication 600 MILLIGRAM(S): at 05:15

## 2022-12-09 RX ADMIN — Medication 600 MILLIGRAM(S): at 04:22

## 2022-12-09 RX ADMIN — Medication 600 MILLIGRAM(S): at 23:48

## 2022-12-09 RX ADMIN — Medication 975 MILLIGRAM(S): at 19:55

## 2022-12-09 RX ADMIN — Medication 600 MILLIGRAM(S): at 16:57

## 2022-12-09 RX ADMIN — Medication 975 MILLIGRAM(S): at 06:58

## 2022-12-09 RX ADMIN — Medication 975 MILLIGRAM(S): at 01:00

## 2022-12-09 RX ADMIN — SIMETHICONE 80 MILLIGRAM(S): 80 TABLET, CHEWABLE ORAL at 17:12

## 2022-12-09 NOTE — PROGRESS NOTE ADULT - ASSESSMENT
A/P: 36yo POD#2 s/p rMTCS.  Patient is stable and doing well post-operatively.    - Continue regular diet.  - Increase ambulation.  - Continue motrin, tylenol, oxycodone PRN for pain control.    Mitra Freire MD  OB/GYN PGY-1

## 2022-12-09 NOTE — PROGRESS NOTE ADULT - SUBJECTIVE AND OBJECTIVE BOX
OB Progress Note: rMTCS, POD#2    S: 36yo POD#2 s/p rMTCS. Pain is well controlled. She is tolerating a regular diet and passing flatus. She is voiding spontaneously, and ambulating without difficulty. Denies CP/SOB. Denies lightheadedness/dizziness. Denies N/V. Denies sxs od PEC: denies headache, visual changes, RUQ pain, respiratory distress    O:  Vitals:  Vital Signs Last 24 Hrs  T(C): 36.7 (09 Dec 2022 09:23), Max: 36.9 (08 Dec 2022 14:13)  T(F): 98.1 (09 Dec 2022 09:23), Max: 98.5 (08 Dec 2022 17:42)  HR: 95 (09 Dec 2022 09:23) (91 - 101)  BP: 138/84 (09 Dec 2022 09:23) (117/72 - 138/84)  BP(mean): --  RR: 18 (09 Dec 2022 09:23) (17 - 19)  SpO2: 98% (09 Dec 2022 09:23) (98% - 100%)    Parameters below as of 09 Dec 2022 09:23  Patient On (Oxygen Delivery Method): room air        MEDICATIONS  (STANDING):  acetaminophen     Tablet .. 975 milliGRAM(s) Oral <User Schedule>  diphtheria/tetanus/pertussis (acellular) Vaccine (Adacel) 0.5 milliLiter(s) IntraMuscular once  heparin   Injectable 5000 Unit(s) SubCutaneous every 12 hours  ibuprofen  Tablet. 600 milliGRAM(s) Oral every 6 hours  lactated ringers Bolus 500 milliLiter(s) IV Bolus once  lactated ringers Bolus 500 milliLiter(s) IV Bolus once      MEDICATIONS  (PRN):  albuterol    90 MICROgram(s) HFA Inhaler 2 Puff(s) Inhalation every 6 hours PRN Shortness of Breath and/or Wheezing  diphenhydrAMINE 25 milliGRAM(s) Oral every 6 hours PRN Pruritus  lanolin Ointment 1 Application(s) Topical every 6 hours PRN Sore Nipples  magnesium hydroxide Suspension 30 milliLiter(s) Oral two times a day PRN Constipation  oxyCODONE    IR 5 milliGRAM(s) Oral once PRN Moderate to Severe Pain (4-10)  oxyCODONE    IR 5 milliGRAM(s) Oral every 3 hours PRN Moderate to Severe Pain (4-10)  simethicone 80 milliGRAM(s) Chew every 4 hours PRN Gas      Labs:  Blood type: B Positive  Rubella IgG: RPR: Negative                          10.9<L>   6.34 >-----------< 165    ( 12-08 @ 06:40 )             35.4                        11.2<L>   9.17 >-----------< 164    ( 12-07 @ 10:00 )             34.7                        11.7   9.09 >-----------< 167    ( 12-07 @ 06:46 )             38.5                        12.5   4.02 >-----------< 184    ( 12-06 @ 15:37 )             40.2    12-08-22 @ 06:40      136  |  102  |  5<L>  ----------------------------<  116<H>  4.2   |  22  |  0.59    12-06-22 @ 15:37      135  |  102  |  6<L>  ----------------------------<  150<H>  4.1   |  21<L>  |  0.51        Ca    8.2<L>      08 Dec 2022 06:40  Ca    9.2      06 Dec 2022 15:37  Mg     4.00<H>     12-07  Mg     4.00<H>     12-07  Mg     3.80<H>     12-07  Mg     3.80<H>     12-06    TPro  6.1  /  Alb  2.6<L>  /  TBili  <0.2  /  DBili  x   /  AST  23  /  ALT  14  /  AlkPhos  91  12-08-22 @ 06:40  TPro  6.4  /  Alb  3.3  /  TBili  0.2  /  DBili  x   /  AST  21  /  ALT  14  /  AlkPhos  106  12-06-22 @ 15:37          PE:  General: NAD  Abdomen: Soft, appropriately tender, incision with 2cm skin separation  Extremities: No erythema, no pitting edema

## 2022-12-10 ENCOUNTER — TRANSCRIPTION ENCOUNTER (OUTPATIENT)
Age: 37
End: 2022-12-10

## 2022-12-10 RX ORDER — ACETAMINOPHEN 500 MG
3 TABLET ORAL
Qty: 0 | Refills: 0 | DISCHARGE
Start: 2022-12-10

## 2022-12-10 RX ORDER — LABETALOL HCL 100 MG
200 TABLET ORAL THREE TIMES A DAY
Refills: 0 | Status: DISCONTINUED | OUTPATIENT
Start: 2022-12-10 | End: 2022-12-12

## 2022-12-10 RX ORDER — IBUPROFEN 200 MG
1 TABLET ORAL
Qty: 0 | Refills: 0 | DISCHARGE
Start: 2022-12-10

## 2022-12-10 RX ORDER — ALBUTEROL 90 UG/1
2 AEROSOL, METERED ORAL
Qty: 0 | Refills: 0 | DISCHARGE
Start: 2022-12-10

## 2022-12-10 RX ORDER — LABETALOL HCL 100 MG
800 TABLET ORAL
Qty: 0 | Refills: 0 | DISCHARGE

## 2022-12-10 RX ORDER — ASPIRIN/CALCIUM CARB/MAGNESIUM 324 MG
0 TABLET ORAL
Qty: 0 | Refills: 0 | DISCHARGE

## 2022-12-10 RX ADMIN — Medication 600 MILLIGRAM(S): at 11:22

## 2022-12-10 RX ADMIN — Medication 600 MILLIGRAM(S): at 19:38

## 2022-12-10 RX ADMIN — Medication 975 MILLIGRAM(S): at 22:40

## 2022-12-10 RX ADMIN — Medication 975 MILLIGRAM(S): at 11:21

## 2022-12-10 RX ADMIN — Medication 975 MILLIGRAM(S): at 02:04

## 2022-12-10 RX ADMIN — Medication 975 MILLIGRAM(S): at 23:40

## 2022-12-10 RX ADMIN — Medication 600 MILLIGRAM(S): at 20:38

## 2022-12-10 RX ADMIN — HEPARIN SODIUM 5000 UNIT(S): 5000 INJECTION INTRAVENOUS; SUBCUTANEOUS at 11:21

## 2022-12-10 RX ADMIN — Medication 600 MILLIGRAM(S): at 06:21

## 2022-12-10 RX ADMIN — Medication 975 MILLIGRAM(S): at 17:23

## 2022-12-10 RX ADMIN — HEPARIN SODIUM 5000 UNIT(S): 5000 INJECTION INTRAVENOUS; SUBCUTANEOUS at 22:41

## 2022-12-10 RX ADMIN — Medication 200 MILLIGRAM(S): at 14:38

## 2022-12-10 RX ADMIN — Medication 975 MILLIGRAM(S): at 03:00

## 2022-12-10 RX ADMIN — Medication 600 MILLIGRAM(S): at 07:11

## 2022-12-10 NOTE — DISCHARGE NOTE OB - NS MD DC FALL RISK RISK
For information on Fall & Injury Prevention, visit: [email or web address] OR  [email or web address] OR  https://www.cdc.gov/steadi/patient.html

## 2022-12-10 NOTE — DISCHARGE NOTE OB - HOSPITAL COURSE
patient admitted for repeat csection dur to prior csx3 and elevated bp,got magnesium sulfate  baby delivered at 36 weeks,2-3 cm ruptur noted intraop,uterus closed in layer,has been off bp meds will start labetalol 200 mg tida  dc home tomorrow   fup in one week with her obgyn

## 2022-12-10 NOTE — DISCHARGE NOTE OB - YES
This office note has been dictated. Nursing notes have been reviewed and accepted.     Statement Selected

## 2022-12-10 NOTE — PROGRESS NOTE ADULT - ASSESSMENT
A/P: 36yo POD#3 s/p rMTCS under GETA d/t inadequate spinal anesthesia in setting of Uterine Rupture. QBL: 1009. Patient is stable and doing well post-operatively.      #Uterine atony  - Hct: 40.2->38.5->34.7->35.4  - H/H stable  - VSS; pt asymptomatic     #Routine Postpartum Care  - Continue with PO pain management  - Increase ambulation  - Continue regular diet  - Dispo: d/c planning       Meggan Gutierres, PGY-1 A/P: 38yo POD#3 s/p rMTCS under GETA d/t inadequate spinal anesthesia in setting of Uterine Rupture. QBL: 1009. Course c/b Superimposed Preeclampsia Patient is stable and doing well post-operatively.      #Superimposed Preeclampsia  - BP: 140/88 (10 Dec 2022 09:33) (127/81 - 146/88)  - Restart Labetalol 200 TID w/ holding parameters 120/80, 80  - HELLP wnl    #Uterine atony  - Hct: 40.2->38.5->34.7->35.4  - H/H stable  - VSS; pt asymptomatic     #Routine Postpartum Care  - Continue with PO pain management  - Increase ambulation  - Continue regular diet  - Dispo: d/c planning for tomorrow      Meggan Gutierres, PGY-1

## 2022-12-10 NOTE — DISCHARGE NOTE OB - CARE PLAN
Principal Discharge DX:	Delivery by elective  section  Assessment and plan of treatment:	s/p repeat csection at 36 weeks  encourage ambulation and breastfeeding  wound care and precaution discussed  fup in onw week with her ob upon discharge  Secondary Diagnosis:	Ruptured uterus before onset of labor, antepartum, third trimester  Assessment and plan of treatment:	s/p repeat csection  uterine rupture noted intraop  fup with her obgyn to discuss contraception methods   1

## 2022-12-10 NOTE — DISCHARGE NOTE OB - MEDICATION SUMMARY - MEDICATIONS TO TAKE
I will START or STAY ON the medications listed below when I get home from the hospital:    acetaminophen 325 mg oral tablet  -- 3 tab(s) by mouth every 6 hours  -- Indication: For Csection    ibuprofen 600 mg oral tablet  -- 1 tab(s) by mouth every 6 hours  -- Indication: For Csection    albuterol 90 mcg/inh inhalation aerosol  -- 2 puff(s) inhaled every 6 hours, As needed, Shortness of Breath and/or Wheezing  -- Indication: For Csection   I will START or STAY ON the medications listed below when I get home from the hospital:    acetaminophen 325 mg oral tablet  -- 3 tab(s) by mouth every 6 hours  -- Indication: For Csection    ibuprofen 600 mg oral tablet  -- 1 tab(s) by mouth every 6 hours  -- Indication: For Csection    labetalol 200 mg oral tablet  -- 1 tab(s) by mouth 3 times a day   -- It is very important that you take or use this exactly as directed.  Do not skip doses or discontinue unless directed by your doctor.  May cause drowsiness or dizziness.  Some non-prescription drugs may aggravate your condition.  Read all labels carefully.  If a warning appears, check with your doctor before taking.    -- Indication: For Chronic hypertension    albuterol 90 mcg/inh inhalation aerosol  -- 2 puff(s) inhaled every 6 hours, As needed, Shortness of Breath and/or Wheezing  -- Indication: For Csection

## 2022-12-10 NOTE — DISCHARGE NOTE OB - CARE PROVIDER_API CALL
Meche Vargas; MPH)  Obstetrics and Gynecology  [address] [place] [address]  Phone: [phone]  Fax: [phone]  Follow Up Time:

## 2022-12-10 NOTE — DISCHARGE NOTE OB - PLAN OF CARE
s/p repeat csection  uterine rupture noted intraop  fup with her obgyn to discuss contraception methods s/p repeat csection at 36 weeks  encourage ambulation and breastfeeding  wound care and precaution discussed  fup in onw week with her ob upon discharge

## 2022-12-10 NOTE — PROGRESS NOTE ADULT - SUBJECTIVE AND OBJECTIVE BOX
POD#3    S: 38yo POD#1 s/p r MTCS . STILL HAVING PAIN ,RELIEVED WITH PO MEDS She is tolerating a regular diet and passing flatus. Denies N/V. Denies CP/SOB/lightheadedness/dizziness.   She is ambulating without difficulty. SHE SAID IS PASSING FLATUS AND HAS BM  Voiding spontaneously.     O:   Vital Signs Last 24 Hrs  T(C): 36.7 (10 Dec 2022 09:33), Max: 37.2 (09 Dec 2022 18:19)  T(F): 98.1 (10 Dec 2022 09:33), Max: 98.9 (09 Dec 2022 18:19)  HR: 87 (10 Dec 2022 09:33) (87 - 97)  BP: 140/88 (10 Dec 2022 09:33) (127/81 - 146/88)    RR: 16 (10 Dec 2022 09:33) (16 - 18)  SpO2: 97% (10 Dec 2022 09:33) (96% - 99%)    Parameters below as of 10 Dec 2022 09:33  Patient On (Oxygen Delivery Method): room air        Labs:  Blood type: B Positive  Rubella IgG: RPR: Negative                          10.9<L>   6.34 >-----------< 165    ( 12-08 @ 06:40 )             35.4    12-08-22 @ 06:40      136  |  102  |  5<L>  ----------------------------<  116<H>  4.2   |  22  |  0.59        Ca    8.2<L>      08 Dec 2022 06:40  Mg     4.00<H>     12-07  Mg     4.00<H>     12-07    TPro  6.1  /  Alb  2.6<L>  /  TBili  <0.2  /  DBili  x   /  AST  23  /  ALT  14  /  AlkPhos  91  12-08-22 @ 06:40          PE:  General: NAD  Abdomen: Mildly distended, appropriately tender, incision c/d/i.SMALL SKIN  SEPARATION 1-2 CM NO INFECTION NO DRAINAGE ON RIGHT SIDE,NO FUNDAL TENDERNESS  Extremities: No erythema, no pitting edema    A/P: 38yo POD#1 s/p RMTCS.  Patient is stable and doing well post-operatively.    -STILL pain and ambulating poorly  - Continue regular diet.  - Increase ambulation.  - Continue motrin, tylenol, oxycodone PRN for pain control.  vs. continue PCEA for pain.  - dc homoe tomorrow

## 2022-12-10 NOTE — DISCHARGE NOTE OB - PATIENT PORTAL LINK FT
You can access the FollowMyHealth Patient Portal offered by [facility] by registering at the following website: [email or web address] By joining [email or web address] FollowMyHealth portal, you will also be able to view your health information using other applications (apps) compatible with our system.

## 2022-12-10 NOTE — PROGRESS NOTE ADULT - SUBJECTIVE AND OBJECTIVE BOX
OB Progress Note: LTCS, POD#3    S: 36yo POD#3 s/p pLTCS. Pain is well controlled. Reports one episode of night sweats overnight. Was AF at that time. Denies feeling warm this AM. She is tolerating a regular diet and passing flatus. She is voiding spontaneously, and ambulating without difficulty. Endorses light vaginal bleeding, soaking <1 pad/hr. Denies CP/SOB. Denies lightheadedness/dizziness. Denies N/V.    O:  Vitals:  Vital Signs Last 24 Hrs  T(C): 36.7 (10 Dec 2022 09:33), Max: 37.2 (09 Dec 2022 18:19)  T(F): 98.1 (10 Dec 2022 09:33), Max: 98.9 (09 Dec 2022 18:19)  HR: 87 (10 Dec 2022 09:33) (87 - 97)  BP: 140/88 (10 Dec 2022 09:33) (127/81 - 146/88)  BP(mean): --  RR: 16 (10 Dec 2022 09:33) (16 - 18)  SpO2: 97% (10 Dec 2022 09:33) (96% - 99%)    Parameters below as of 10 Dec 2022 09:33  Patient On (Oxygen Delivery Method): room air        PE:  General: NAD  Heart: extremities well-perfused  Lungs: breathing normally  Abdomen: Soft, appropriately tender, incision c/d/i,   Extremities: No erythema, no pitting edema    MEDICATIONS  (STANDING):  acetaminophen     Tablet .. 975 milliGRAM(s) Oral <User Schedule>  diphtheria/tetanus/pertussis (acellular) Vaccine (Adacel) 0.5 milliLiter(s) IntraMuscular once  heparin   Injectable 5000 Unit(s) SubCutaneous every 12 hours  ibuprofen  Tablet. 600 milliGRAM(s) Oral every 6 hours  lactated ringers Bolus 500 milliLiter(s) IV Bolus once  lactated ringers Bolus 500 milliLiter(s) IV Bolus once      MEDICATIONS  (PRN):  albuterol    90 MICROgram(s) HFA Inhaler 2 Puff(s) Inhalation every 6 hours PRN Shortness of Breath and/or Wheezing  diphenhydrAMINE 25 milliGRAM(s) Oral every 6 hours PRN Pruritus  lanolin Ointment 1 Application(s) Topical every 6 hours PRN Sore Nipples  magnesium hydroxide Suspension 30 milliLiter(s) Oral two times a day PRN Constipation  oxyCODONE    IR 5 milliGRAM(s) Oral once PRN Moderate to Severe Pain (4-10)  oxyCODONE    IR 5 milliGRAM(s) Oral every 3 hours PRN Moderate to Severe Pain (4-10)  simethicone 80 milliGRAM(s) Chew every 4 hours PRN Gas      Labs:  Blood type: B Positive  Rubella IgG: RPR: Negative                          10.9<L>   6.34 >-----------< 165    ( 12-08 @ 06:40 )             35.4    12-08-22 @ 06:40      136  |  102  |  5<L>  ----------------------------<  116<H>  4.2   |  22  |  0.59        Ca    8.2<L>      08 Dec 2022 06:40  Mg     4.00<H>     12-07  Mg     4.00<H>     12-07    TPro  6.1  /  Alb  2.6<L>  /  TBili  <0.2  /  DBili  x   /  AST  23  /  ALT  14  /  AlkPhos  91  12-08-22 @ 06:40             OB Progress Note: LTCS, POD#3    S: 38yo POD#3 s/p pLTCS. Pain is mildly controlled. Reports one episode of night sweats overnight. Was AF at that time. Denies feeling warm this AM. She is tolerating a regular diet and passing flatus. She is voiding spontaneously, and ambulating without difficulty. Endorses light vaginal bleeding, soaking <1 pad/hr. Denies CP/SOB. Denies lightheadedness/dizziness. Denies N/V.    O:  Vitals:  Vital Signs Last 24 Hrs  T(C): 36.7 (10 Dec 2022 09:33), Max: 37.2 (09 Dec 2022 18:19)  T(F): 98.1 (10 Dec 2022 09:33), Max: 98.9 (09 Dec 2022 18:19)  HR: 87 (10 Dec 2022 09:33) (87 - 97)  BP: 140/88 (10 Dec 2022 09:33) (127/81 - 146/88)  BP(mean): --  RR: 16 (10 Dec 2022 09:33) (16 - 18)  SpO2: 97% (10 Dec 2022 09:33) (96% - 99%)    Parameters below as of 10 Dec 2022 09:33  Patient On (Oxygen Delivery Method): room air        PE:  General: NAD  Heart: extremities well-perfused  Lungs: breathing normally  Abdomen: Soft, appropriately tender, incision c/d/i,   Extremities: No erythema, no pitting edema    MEDICATIONS  (STANDING):  acetaminophen     Tablet .. 975 milliGRAM(s) Oral <User Schedule>  diphtheria/tetanus/pertussis (acellular) Vaccine (Adacel) 0.5 milliLiter(s) IntraMuscular once  heparin   Injectable 5000 Unit(s) SubCutaneous every 12 hours  ibuprofen  Tablet. 600 milliGRAM(s) Oral every 6 hours  lactated ringers Bolus 500 milliLiter(s) IV Bolus once  lactated ringers Bolus 500 milliLiter(s) IV Bolus once      MEDICATIONS  (PRN):  albuterol    90 MICROgram(s) HFA Inhaler 2 Puff(s) Inhalation every 6 hours PRN Shortness of Breath and/or Wheezing  diphenhydrAMINE 25 milliGRAM(s) Oral every 6 hours PRN Pruritus  lanolin Ointment 1 Application(s) Topical every 6 hours PRN Sore Nipples  magnesium hydroxide Suspension 30 milliLiter(s) Oral two times a day PRN Constipation  oxyCODONE    IR 5 milliGRAM(s) Oral once PRN Moderate to Severe Pain (4-10)  oxyCODONE    IR 5 milliGRAM(s) Oral every 3 hours PRN Moderate to Severe Pain (4-10)  simethicone 80 milliGRAM(s) Chew every 4 hours PRN Gas      Labs:  Blood type: B Positive  Rubella IgG: RPR: Negative                          10.9<L>   6.34 >-----------< 165    ( 12-08 @ 06:40 )             35.4    12-08-22 @ 06:40      136  |  102  |  5<L>  ----------------------------<  116<H>  4.2   |  22  |  0.59        Ca    8.2<L>      08 Dec 2022 06:40  Mg     4.00<H>     12-07  Mg     4.00<H>     12-07    TPro  6.1  /  Alb  2.6<L>  /  TBili  <0.2  /  DBili  x   /  AST  23  /  ALT  14  /  AlkPhos  91  12-08-22 @ 06:40

## 2022-12-11 RX ADMIN — Medication 975 MILLIGRAM(S): at 09:30

## 2022-12-11 RX ADMIN — Medication 600 MILLIGRAM(S): at 01:18

## 2022-12-11 RX ADMIN — Medication 975 MILLIGRAM(S): at 23:07

## 2022-12-11 RX ADMIN — Medication 600 MILLIGRAM(S): at 09:30

## 2022-12-11 RX ADMIN — Medication 975 MILLIGRAM(S): at 08:58

## 2022-12-11 RX ADMIN — HEPARIN SODIUM 5000 UNIT(S): 5000 INJECTION INTRAVENOUS; SUBCUTANEOUS at 11:25

## 2022-12-11 RX ADMIN — Medication 200 MILLIGRAM(S): at 23:07

## 2022-12-11 RX ADMIN — Medication 200 MILLIGRAM(S): at 01:18

## 2022-12-11 RX ADMIN — Medication 600 MILLIGRAM(S): at 01:52

## 2022-12-11 RX ADMIN — Medication 200 MILLIGRAM(S): at 16:01

## 2022-12-11 RX ADMIN — Medication 600 MILLIGRAM(S): at 16:01

## 2022-12-11 RX ADMIN — Medication 975 MILLIGRAM(S): at 16:01

## 2022-12-11 RX ADMIN — Medication 600 MILLIGRAM(S): at 23:07

## 2022-12-11 RX ADMIN — HEPARIN SODIUM 5000 UNIT(S): 5000 INJECTION INTRAVENOUS; SUBCUTANEOUS at 23:07

## 2022-12-11 RX ADMIN — Medication 200 MILLIGRAM(S): at 09:07

## 2022-12-11 RX ADMIN — Medication 600 MILLIGRAM(S): at 08:58

## 2022-12-11 NOTE — PROGRESS NOTE ADULT - ASSESSMENT
A/P: 36yo POD#2 s/p rMTCS under GETA d/t inadequate spinal anesthesia in setting of Uterine Rupture. QBL: 1009. Course c/b Superimposed Preeclampsia Patient is stable and doing well post-operatively.      #Back pain  - pending Anesthesia eval   - Instructed pt to use conservative measures in the interim     #Superimposed Preeclampsia  - BP: 140/88 (10 Dec 2022 09:33) (127/81 - 146/88)  - Restart Labetalol 200 TID w/ holding parameters 120/80, 80  - HELLP wnl    #Uterine atony  - Hct: 40.2->38.5->34.7->35.4  - H/H stable  - VSS; pt asymptomatic     #Routine Postpartum Care  - Continue with PO pain management  - Increase ambulation  - Continue regular diet  - Discharge planning for today       Meggan Gutierres, PGY-1

## 2022-12-11 NOTE — PROGRESS NOTE ADULT - ATTENDING COMMENTS
patient seen and evaluated  agreed with resident note  still having back pain  anesthesia consult pending  baby not cleared for discharge yet  she want to stay and go home with her baby   will cancer dc order  probably dc home home tomorrow
PT SEEN AND EVALUATED  DOING WELL  STABLE V'S  STABLE H/H  OOB  POSSIBLE DC HOME IN AM

## 2022-12-11 NOTE — PROGRESS NOTE ADULT - SUBJECTIVE AND OBJECTIVE BOX
OB Progress Note: LTCS, POD#2    S: 36yo POD#2 s/p pLTCS. Pain is mildly controlled. Reporting 5/10 abd pain; relief noted w/ M/T/O. Also c/o back pain @ spinal epidural site. Reports pain radiates down to the bilateral LE. She is tolerating a regular diet and passing flatus. She is voiding spontaneously, and ambulating without difficulty. Endorses light vaginal bleeding, soaking <1 pad/hr. Denies CP/SOB. Denies lightheadedness/dizziness. Denies N/V.    O:  Vitals:  Vital Signs Last 24 Hrs  T(C): 36.7 (11 Dec 2022 06:40), Max: 37 (10 Dec 2022 17:36)  T(F): 98 (11 Dec 2022 06:40), Max: 98.6 (10 Dec 2022 17:36)  HR: 84 (11 Dec 2022 06:40) (84 - 100)  BP: 144/96 (11 Dec 2022 06:40) (124/65 - 146/99)  BP(mean): --  RR: 17 (11 Dec 2022 06:40) (16 - 18)  SpO2: 100% (11 Dec 2022 06:40) (95% - 100%)    Parameters below as of 11 Dec 2022 06:40  Patient On (Oxygen Delivery Method): room air        PE:  General: NAD  Heart: extremities well-perfused  Lungs: breathing normally  Abdomen: Soft, appropriately tender, incision c/d/i,   Extremities: No erythema, no pitting edema, 5/5 strength in LE    MEDICATIONS  (STANDING):  acetaminophen     Tablet .. 975 milliGRAM(s) Oral <User Schedule>  diphtheria/tetanus/pertussis (acellular) Vaccine (Adacel) 0.5 milliLiter(s) IntraMuscular once  heparin   Injectable 5000 Unit(s) SubCutaneous every 12 hours  ibuprofen  Tablet. 600 milliGRAM(s) Oral every 6 hours  labetalol 200 milliGRAM(s) Oral three times a day  lactated ringers Bolus 500 milliLiter(s) IV Bolus once  lactated ringers Bolus 500 milliLiter(s) IV Bolus once      MEDICATIONS  (PRN):  albuterol    90 MICROgram(s) HFA Inhaler 2 Puff(s) Inhalation every 6 hours PRN Shortness of Breath and/or Wheezing  diphenhydrAMINE 25 milliGRAM(s) Oral every 6 hours PRN Pruritus  lanolin Ointment 1 Application(s) Topical every 6 hours PRN Sore Nipples  magnesium hydroxide Suspension 30 milliLiter(s) Oral two times a day PRN Constipation  oxyCODONE    IR 5 milliGRAM(s) Oral every 3 hours PRN Moderate to Severe Pain (4-10)  oxyCODONE    IR 5 milliGRAM(s) Oral once PRN Moderate to Severe Pain (4-10)  simethicone 80 milliGRAM(s) Chew every 4 hours PRN Gas      Labs:  Blood type: B Positive  Rubella IgG: RPR: Negative

## 2022-12-11 NOTE — PROGRESS NOTE ADULT - SUBJECTIVE AND OBJECTIVE BOX
called by Ob to team evaluate patient's pain    patient reporting several days midline lumbar pain, non radiating, which started several days prior to admission. was managed conservatively but has now had to reposition herself in bed often which is making pain worse. denies radiation into legs or abdomen, numbness, tingling, weakness    on exam, neurosensory intact in legs b/l. but exquisite tenderness upon palpation over several lumbar vertebrae spinous processes.     recommend lumbar spina, AP and lateral x-rays to rule out vertebral fracture. low concern for this to have any relationship to anesthetic

## 2022-12-12 VITALS
HEART RATE: 84 BPM | TEMPERATURE: 98 F | SYSTOLIC BLOOD PRESSURE: 136 MMHG | OXYGEN SATURATION: 100 % | DIASTOLIC BLOOD PRESSURE: 85 MMHG | RESPIRATION RATE: 19 BRPM

## 2022-12-12 PROCEDURE — 93970 EXTREMITY STUDY: CPT | Mod: 26

## 2022-12-12 PROCEDURE — 72100 X-RAY EXAM L-S SPINE 2/3 VWS: CPT | Mod: 26

## 2022-12-12 RX ORDER — LABETALOL HCL 100 MG
1 TABLET ORAL
Qty: 180 | Refills: 0
Start: 2022-12-12 | End: 2023-02-09

## 2022-12-12 RX ADMIN — Medication 975 MILLIGRAM(S): at 09:30

## 2022-12-12 RX ADMIN — Medication 600 MILLIGRAM(S): at 06:29

## 2022-12-12 RX ADMIN — Medication 200 MILLIGRAM(S): at 06:30

## 2022-12-12 RX ADMIN — OXYCODONE HYDROCHLORIDE 5 MILLIGRAM(S): 5 TABLET ORAL at 09:30

## 2022-12-12 RX ADMIN — Medication 600 MILLIGRAM(S): at 07:00

## 2022-12-12 RX ADMIN — Medication 975 MILLIGRAM(S): at 07:00

## 2022-12-12 RX ADMIN — Medication 975 MILLIGRAM(S): at 06:30

## 2022-12-12 RX ADMIN — Medication 200 MILLIGRAM(S): at 13:57

## 2022-12-12 RX ADMIN — Medication 600 MILLIGRAM(S): at 00:00

## 2022-12-12 RX ADMIN — Medication 975 MILLIGRAM(S): at 00:00

## 2022-12-12 NOTE — PROGRESS NOTE ADULT - SUBJECTIVE AND OBJECTIVE BOX
POD#5    S: 38yo POD#5 s/p rMTCS ,c/o pain on lower extremities on left side and swelling, also still with back pain anesthesia saw her and recommended x ray of lumbar spine-stillpending  She is ambulating without difficulty.   Voiding spontaneously.     O:   Vital Signs Last 24 Hrs  T(C): 37.1 (12 Dec 2022 06:13), Max: 37.2 (11 Dec 2022 22:18)  T(F): 98.7 (12 Dec 2022 06:13), Max: 99 (11 Dec 2022 22:18)  HR: 81 (12 Dec 2022 06:13) (81 - 96)  BP: 146/84 (12 Dec 2022 06:13) (132/82 - 146/84)  RR: 17 (12 Dec 2022 06:13) (16 - 18)  SpO2: 95% (12 Dec 2022 06:13) (95% - 100%)    Parameters below as of 12 Dec 2022 06:13  Patient On (Oxygen Delivery Method): room air        Labs:  Blood type: B Positive  Rubella IgG: RPR: Negative                    PE:  General: NAD  Abdomen: Mildly distended, appropriately tender, incision c/d/i.small skin separation no drainage no erythema  Extremities:pain ful on palpation especially left lower extremity calf tenderness pain in front ,    A/P: 38yo POD#1 s/p MTCS  with uterine rupture .  no pain on lower extremties  -pending x ray of back  - Continue regular diet.  - Increase ambulation.  - Continue motrin, tylenol, prn for pain  -janette order doppler of LE   -x ray of lumbar spine pendding  -if both rest negative ca be discharged home to fup with her obgyn in 2 days        POD#5    S: 38yo POD#5 s/p rMTCS ,c/o pain on lower extremities on left side and swelling, also still with back pain anesthesia saw her and recommended x ray of lumbar spine-stillpending  She is ambulating without difficulty.   Voiding spontaneously.     O:   Vital Signs Last 24 Hrs  T(C): 37.1 (12 Dec 2022 06:13), Max: 37.2 (11 Dec 2022 22:18)  T(F): 98.7 (12 Dec 2022 06:13), Max: 99 (11 Dec 2022 22:18)  HR: 81 (12 Dec 2022 06:13) (81 - 96)  BP: 146/84 (12 Dec 2022 06:13) (132/82 - 146/84)  RR: 17 (12 Dec 2022 06:13) (16 - 18)  SpO2: 95% (12 Dec 2022 06:13) (95% - 100%)    Parameters below as of 12 Dec 2022 06:13  Patient On (Oxygen Delivery Method): room air        Labs:  Blood type: B Positive  Rubella IgG: RPR: Negative                    PE:  General: NAD  Abdomen: Mildly distended, appropriately tender, incision c/d/i.small skin separation no drainage no erythema  Extremities:pain ful on palpation especially left lower extremity calf tenderness pain in front ,    A/P: 38yo POD#1 s/p MTCS  with uterine rupture .  no pain on lower extremties  -pending x ray of back  - Continue regular diet.  - Increase ambulation.  - Continue motrin, tylenol, prn for pain  -janette order doppler of LE   -x ray of lumbar spine pending  -if both tests negative can be discharged home, fup with her obgyn in 2 days for bp check and incision check

## 2022-12-13 ENCOUNTER — NON-APPOINTMENT (OUTPATIENT)
Age: 37
End: 2022-12-13

## 2022-12-14 ENCOUNTER — NON-APPOINTMENT (OUTPATIENT)
Age: 37
End: 2022-12-14

## 2022-12-21 LAB — SURGICAL PATHOLOGY STUDY: SIGNIFICANT CHANGE UP

## 2023-02-17 NOTE — HISTORY OF PRESENT ILLNESS
[FreeTextEntry1] : 37 year old woman now 22.4 weeks pregnant with hstory of PEC and HTN here for followup. At last visit we switched her Procardia to Labetalol due to severe HA. \par Her Labetalol was inncreased recently to 200 mg TID\par \par OB history :\par -  @ FT no complications \par - C/ section due to Preeclampsia @ 32 weeks \par - C/ section due to Preeclampsia @ 36 weeks\par  - c/section due to Preclampsia @ 32 weeks \par -  \par - Miscarriage @ 2 months \par - Current pregnancy \par \par # Pregnancy induced hypertension: \par BP labile \par Labetalol  200 mg TID--increase 400 mg TID\par 
Pt. is a 50 y/o male presents to the ED with complaint of Lt foot and leg swelling , redness and pain x 5 days. found to have cellulitis and treated with abx and improved marked site. now being d/c in stable condition

## 2023-06-06 NOTE — OB RN PATIENT PROFILE - THE IMPORTANCE OF INITIATING BREASTFEEDING WITHIN THE FIRST HOUR OF BIRTH.
For office appointments, please call [phone]    You may reach my nurse, Lisette, at [phone]    You may reach my surgery scheduler, Asuncion, at [phone]    For anything else, please call [phone]    To schedule any ordered imaging tests call centralized scheduling at [phone]     Thank you for giving the physicians of [facility] the pleasure of providing you with care.  We would like to take this opportunity to communicate a few things that we feel will be helpful to you regarding your continued care.    If you have had any tests ordered by a provider at the time of your visit and have not heard from the office within 2-3 days, please contact us.  [facility] is always a great way to communicate with your provider.  Please consider this option as an effective form of communication.    Medical questions will be addressed by our qualified nursing staff.  Please contact our office to speak to one of the nurses.    If you need a surgical procedure or have questions regarding an upcoming surgery, please contact your physician's surgery scheduler.    Again, we would like to thank you for letting our team of physicians and our staff provide your urologic care.      Please contact us if you have any questions regarding information contained in your after visit summary.    Please e-mail back through \"[facility]\" or contact our office by telephone if you have questions.  Please use telephone contact if you have an urgent question or problem rather than e-mail.    Thank you,    Khang Cormier MD, FACS    [facility]  Office [phone]      Here are the results of recent tests that you had performed-:    CT CHEST ABDOMEN PELVIS W CONTRAST    Result Date: 5/25/2023  Narrative: CT CHEST ABDOMEN PELVIS W CONTRAST HISTORY: Metastatic disease evaluation, Follow-up metastatic kidney cancer. COMPARISON: February 1, 2023 CT TECHNIQUE: Multiple helical axial scans of the chest, abdomen, and pelvis were obtained  after  100 mL Omnipaque 300 intravenous contrast injection with 2-D coronal and sagittal reformats. FINDINGS: Complex right kidney mass extending to the liver measuring 10 cm is relatively stable in size compared to the prior CT. The mass also likely relates the right iliopsoas muscle. Right hydronephrosis with UPJ stenosis potentially due to underlying stone or scarring. Please see series 4 image 65. Several tiny calcified and noncalcified pulmonary nodules are fairly similar to the prior CT and could be benign. No definite suspicious lung nodule. No mediastinal lymphadenopathy based on CT size criteria. No definite hilar lymphadenopathy. No pericardial effusion. No convincing evidence of renal vein thrombus. No convincing evidence of distant abdominopelvic lymphadenopathy based on CT size criteria. No suspicious metastatic bone lesion identified. A few tiny hypodense liver low-density foci similar to the prior CT, likely benign.     Impression: IMPRESSION: 1. Persistent at least 10 cm right kidney neoplastic mass with direct invasion into the surrounding tissue including the liver and the right iliopsoas muscle. 2. Right hydronephrosis potentially because of right UPJ stone or scar. 3. No suspicious lung lesion. No suspicious bone lesion. No CT evidence of renal vein thrombus. No distant metastatic lymphadenopathy.        Recent Labs   Lab 05/25/23  0757   Glucose 106*   Sodium 141   Potassium 4.4   Chloride 105   BUN 32*   Creatinine 1.81*   Calcium 9.0   Albumin 3.4*   GOT/AST 22   Alkaline Phosphatase 66   GPT 23   Anion Gap 12   Globulin 4.0   A/G Ratio 0.9*       WBC (K/mcL)   Date Value   05/25/2023 5.9     RBC (mil/mcL)   Date Value   05/25/2023 4.79     HCT (%)   Date Value   05/25/2023 41.1     HGB (g/dL)   Date Value   05/25/2023 13.0     PLT (K/mcL)   Date Value   05/25/2023 216   -    CULTURE (no units)   Date Value   02/21/2018 NO BETA HEMOLYTIC STREPTOCOCCI ISOLATED       PSA, Total (ng/mL)   Date  Value   03/16/2019 1.93   03/10/2018 2.16   03/11/2017 1.89   03/12/2016 1.62   03/07/2015 1.53   02/22/2014 1.28     Prostate Specific Antigen (ng/mL)   Date Value   01/22/2022 3.13             Please e-mail back through \"[facility]\" or contact our office by telephone if you have questions.  Please use telephone contact if you have an urgent question or problem rather than e-mail.    Thank you,    Khang Cormier MD, FACS    [facility]  Office [phone]      We wanted to provide you with information regarding kidney (renal) masses to review after your visit today.  This information includes \"Dr. Speak\" and is not meant as a substitute for our discussion so please do not hesitate to contact me by telephone or electronically through [employer] if you have questions.  Additionally, the American Urological Association at auanet.org, National Cancer Care Network at nccn.org, National Cancer [place] at cancer.gov and the kidney cancer association at kidneycancer.org provide on line resources for review of information regarding renal masses.    Our Kidney team includes:    Urologists:  Khang Cormier MD, FACS and Jos Elizondo MD FACS     Nurse navigator Lisbeth Luo available Monday through Wednesday at [phone]    Office nurse for Dr. Genia Moore RN - [phone]  Office nurse for Dr. Caro Umanzor RN - [phone]    For office appointments please call [phone]    Surgery scheduler for robot kidney and adrenal cases is Asuncion Stahl - [phone]    Advanced practice providers - Usama Mullen PA-C, Dedra bAdi NP, Tatiana Bahena NP, Sulema Medrano NP, Bee Varghese NP, Disha Hassan NP, Fiona Gonzalze NP, Vladimir Cabral NP      Multidisciplinary/team approach to renal masses  We may present your case at the [place] multidisciplinary urology tumor conference to obtain the input of physicians in other specialties as well as other urologists. Additionally, other  specialist may be utilized in your care including an oncologist, nephrologist, vascular surgeon, transplant surgeon, genetic counselor, cardiovascular surgeon, surgical oncologist, internal medicine subspecialist or radiation oncologist depending on an individual patient's situation.  Our kidney specific medical oncologists are Melissa Thomas and Juan Manuel Hess.        The assessment and plan for your visit today is:    ASSESSMENT  Large right renal mass with liver invasion and right psoas muscle invasion with right lower extremity numbness - back pain has improved with systemic therapy and mass has responded to combination tyrosine kinase inhibitor and immune Check point inhibitor therapy.  Patient seen at request of Dr. Dubon to perform cytoreductive surgery.  Patient will see Dr. Jiménez from Surgical Oncology who will be available for liver resection and possible right colon resection.  We will check preop MRI for assessment of inferior vena cava as well as nuclear medicine renal scan for laterality of function although I anticipate right kidney is non functional.                      Patient Active Problem List     Diagnosis Date Noted    Anxiety 07/01/2022       Priority: Low    Primary malignant neoplasm of right kidney with metastasis from kidney to other site (CMD) 05/17/2022       Priority: Low       5/22    Kidney cancer with invasion into the liver and psoas muscle and mets to the liver and nodes             Pembrolizumab/ Axitinib       Increased prostate specific antigen (PSA) velocity 01/30/2022       Priority: Low    Family history of blood clots 01/26/2022       Priority: Low    Family history of thoracic aortic aneurysm 03/19/2016       Priority: Low    Pectus excavatum         Priority: Low    Encounter for preventive health examination 03/17/2015       Priority: Low         History - past surgical         Past Surgical History:   Procedure Laterality Date    Anes tonsillectomy prim/secndry;  under a                Body mass index is 24.41 kg/m².           Recent Labs   Lab 05/25/23  0757 05/05/23  1320 04/14/23  1351   Glomerular Filtration Rate 43* 35* 45*              ALLERGIES:   Allergen Reactions    Voltaren RASH         CT CHEST ABDOMEN PELVIS W CONTRAST     Result Date: 5/25/2023  Narrative: CT CHEST ABDOMEN PELVIS W CONTRAST HISTORY: Metastatic disease evaluation, Follow-up metastatic kidney cancer. COMPARISON: February 1, 2023 CT TECHNIQUE: Multiple helical axial scans of the chest, abdomen, and pelvis were obtained after  100 mL Omnipaque 300 intravenous contrast injection with 2-D coronal and sagittal reformats. FINDINGS: Complex right kidney mass extending to the liver measuring 10 cm is relatively stable in size compared to the prior CT. The mass also likely relates the right iliopsoas muscle. Right hydronephrosis with UPJ stenosis potentially due to underlying stone or scarring. Please see series 4 image 65. Several tiny calcified and noncalcified pulmonary nodules are fairly similar to the prior CT and could be benign. No definite suspicious lung nodule. No mediastinal lymphadenopathy based on CT size criteria. No definite hilar lymphadenopathy. No pericardial effusion. No convincing evidence of renal vein thrombus. No convincing evidence of distant abdominopelvic lymphadenopathy based on CT size criteria. No suspicious metastatic bone lesion identified. A few tiny hypodense liver low-density foci similar to the prior CT, likely benign.      Impression: IMPRESSION: 1. Persistent at least 10 cm right kidney neoplastic mass with direct invasion into the surrounding tissue including the liver and the right iliopsoas muscle. 2. Right hydronephrosis potentially because of right UPJ stone or scar. 3. No suspicious lung lesion. No suspicious bone lesion. No CT evidence of renal vein thrombus. No distant metastatic lymphadenopathy.              Recent Labs   Lab 05/25/23  0757   Glucose 106*    Sodium 141   Potassium 4.4   Chloride 105   BUN 32*   Creatinine 1.81*   Calcium 9.0   Albumin 3.4*   GOT/AST 22   Alkaline Phosphatase 66   GPT 23   Anion Gap 12   Globulin 4.0   A/G Ratio 0.9*             WBC (K/mcL)   Date Value   05/25/2023 5.9          RBC (mil/mcL)   Date Value   05/25/2023 4.79          HCT (%)   Date Value   05/25/2023 41.1          HGB (g/dL)   Date Value   05/25/2023 13.0          PLT (K/mcL)   Date Value   05/25/2023 216               Lab Results   Component Value Date     COL Yellow 05/25/2023     UAPP Clear 05/25/2023     USPG 1.025 05/25/2023     UPH 5.5 05/25/2023     UPROT Negative 05/25/2023     UGLU Negative 05/25/2023     UKET Negative 05/25/2023     UBILI Negative 05/25/2023     URBC Trace (A) 05/25/2023     UNITR Negative 05/25/2023     UROB 0.2 05/25/2023     UWBC Negative 05/25/2023         No results found for: 5COL, 5UAPP, 5UGLU, 5UBILI, 5UKET, 5USPG, 5URBC, 5UPH, 5UPROT, 5UURP, POCTUNITR, 5UWBC           PLAN  Check urine protein to creatinine ratio     Nuclear medicine renal scan for laterality of function     MRI abdomen/venogram to assess for inferior vena cava invasion 1 week prior to surgery     Right robot assisted laparoscopic radical nephrectomy with liver resection and possible right colon resection to be done in conjunction with Dr. Jiménez.  Possible partial resection of psoas muscle.  Possible open surgery.  Bowel prep preoperatively.  Allow 4 hours for surgery.     Patient information:  renal mass, radical nephrectomy, ckd     PCP clearance for surgery           Large right renal mass with liver invasion and right psoas muscle invasion with right lower extremity numbness - back pain has improved with systemic therapy and mass has responded to combination tyrosine kinase inhibitor and immune Check point inhibitor therapy.  Patient seen at request of Dr. Dubon to perform cytoreductive surgery.  Patient will see Dr. Jiménez from Surgical Oncology who will  be available for liver resection and possible right colon resection.  We will check preop MRI for assessment of inferior vena cava as well as nuclear medicine renal scan for laterality of function although I anticipate right kidney is non functional.  Potential increase in lower extremity neuropathy symptoms explained.        RADICAL NEPHRECTOMY:  Open and robot assisted laparoscopic radical nephrectomy was discussed in detail. Specific risks include but are not limited to:  Bleeding, infection, bowel injury, pancreatitis, ileus, bowel obstruction, splenic injury, injury to vascular structures, chronic pain, conversion from a laparoscopic to an open nephrectomy, and postoperative renal insufficiency or failure.  The operative approach including intended incision sites and anesthetic concerns were shared.  The possible need for nephroureterectomy was discussed.  General risks of surgery and postoperative convalescence were reviewed. There is a chance of local and metastatic recurrence and no guarantee of cure can be provided.  The operative approach including intended incision site and anesthetic concerns were shared.  In the case of robotic assisted laparoscopic radical nephrectomy the potential need to convert to an open operation is explained.  The nature of flank bulge and chronic flank pain is discussed.  The need for long-term follow-up was emphasized.  The potential need for blood transfusion is discussed.  Potential medical complications including but not limited to deep venous thrombosis, pulmonary embolus, and myocardial infarction were reviewed.  Questions were answered.              RENAL MASS DISCUSSION  This patient is found to have a renal mass or complex renal cyst.  Management options were discussed today.  Options include:  Observation, surgery and percutaneous ablation.  The surgical options reviewed are:  Partial nephrectomy, radical nephrectomy, laparoscopic-assisted ablation and percutaneous  ablation.  Surgical approaches including robotic assisted laparoscopy and open surgery were discussed.  We discussed how we make management recommendations based on tumor size, overall renal function, split renal function, evidence of tumor spread and medical co-morbidities. It is not a certainty that all renal masses contain carcinoma.  Probability of a carcinoma in a given renal mass is based on multiple variables and these were discussed.  Patient's questions were answered.        To maintain/possibly improve kidney function patient is advised to:  1. Maintain a normal blood pressure - goal of maintaining blood pressures in the 110- 130s over 60-80 range.  2. Avoid dehydration/drink water- at least 48 ounces per day  3. Avoid nonsteroidal anti-inflammatory drug use including Advil, Aleve, ibuprofen, naproxen, since these can cause kidney damage.  Over-the-counter Tylenol is okay from the standpoint of kidney insufficiency but do not exceed 3000 mg per day of Tylenol to avoid liver damage  4. Consider nephrology consultation  5. Maintain healthy lifestyle and diet  6. Dietary sodium restriction to help minimize hypertension and need for diuretic therapy  7. Dietary management of hyperlipidemia and consider statin therapy     Long-term follow-up if a kidney cancer is identified will be dictated by the final pathology findings (stage and grade), but will involve periodic history, physical examination, lab tests and imaging of the abdomen and chest. Additional information will be provided once final results are known.        Healthy lifestyle recommendations that may reduce the risk of cancer discussed:     Maintain ideal body mass index  Avoid smoked and cured meats  5+ servings of fruits and vegetables per day  Avoid simple sugars  Plant based diet  No tobacco use  Avoid binge drinking and daily usage of alcohol (At least 2-3 days per week with no alcohol and on other days 1-2 standard drinks)  Exercise 150 minutes  per week if okay with your primary care physician  Manage stress which may include use of meditation, yoga, prayer  Diet that include whole grains and low in saturated/trans fat   Continue careful follow-up with your primary care physician and other members your medical team and consider regular screening for other cancers  Maintain adequate vitamin-D level              Information from National Cancer Care Network available at NCCN.org                     Information from national cancer care network website regarding active surveillance (nccn.org):             Information from American urologic Association regarding active surveillance                Progression criteria for recommending treatment at presentation or during follow-up for small renal masses:     Absence of benign tumor biopsy histology  Presence of a maximum tumor diameter greater than 4 cm  Tumor growth rate greater than 5 mm per year  Maximum tumor diameter greater than 3 cm plus a tumor growth rate of 3 mm or greater per year  High-risk biopsy histology  Clinical stage T3a tumor suspected  Symptoms from a small renal mass        Information from American Urologic Association available at auanet.org     Guideline Statements     Evaluation and Diagnosis     In patients with a solid or complex cystic renal mass, physicians should obtain high quality, multiphase, cross-sectional abdominal imaging to optimally characterize and clinically stage the renal mass. Characterization of the renal mass should include assessment of tumor complexity, degree of contrast enhancement (where applicable), and presence or absence of fat. (Clinical Principle)     In patients with suspected renal malignancy, physicians should obtain comprehensive metabolic panel, complete blood count, and urinalysis. Metastatic evaluation should include chest imaging to evaluate for possible thoracic metastases. (Clinical Principle)     For patients with a solid or complex cystic renal  mass, physicians should assign CKD stage based on GFR and degree of proteinuria. (Expert Opinion)           Counseling     In patients with a solid or Bosniak 3/4 complex cystic renal mass, a urologist should lead the counseling process and should consider all management strategies. A multidisciplinary team should be included when necessary. (Expert Opinion)     Physicians should provide counseling that includes current perspectives about tumor biology and a patient-specific risk assessment inclusive of sex, tumor size/complexity, histology (when obtained), and imaging characteristics.  For cT1a tumors, the low oncologic risk of many small renal masses should be reviewed. (Clinical Principle)     During counseling of patients with a solid or Bosniak 3/4 complex cystic renal mass, physicians must review the most common and serious urologic and non-urologic morbidities of each treatment pathway and the importance of patient age, comorbidities/frailty, and life expectancy.   (Clinical Principle)     Physicians should review the importance of renal functional recovery related to renal mass management, including the risk of progressive CKD, potential short- or long-term need for renal replacement therapy, and long-term overall survival considerations. (Clinical Principle)     Physicians should consider referral to nephrology in patients with a high risk of CKD progression. Such patients may include those with eGFR less than 45 ml/min/1.73m2, confirmed proteinuria, diabetics with preexisting CKD,  or whenever eGFR is expected to be less than 30 ml/min/1.81y9agxwm intervention. (Expert Opinion)     Physicians should recommend genetic counseling for all patients ? 46 years of age with renal malignancy and consider genetic counseling for patients with multifocal or bilateral renal masses, or if personal or family history suggests a familial renal neoplastic syndrome. (Expert Opinion)           Renal Mass Biopsy (RMB)      Renal mass biopsy should be considered when a mass is suspected to be hematologic, metastatic, inflammatory, or infectious. (Clinical Principle)     In the setting of a solid renal mass, RMB is not required for: 1) young or healthy patients who are unwilling to accept the uncertainties associated with RMB; or 2) older or frail patients who will be managed conservatively independent of RMB findings. (Expert Opinion)     When considering the utility of RMB, patients should be counseled regarding rationale, positive and negative predictive values, potential risks and non-diagnostic rates of RMB. (Clinical Principle)     For patients with a solid renal mass who elect RMB, multiple core biopsies are preferred over fine needle aspiration. (Moderate Recommendation; Evidence Level: Grade C)  Management              Partial Nephrectomy (PN) and Nephron-Sparing Approaches     Physicians should prioritize PN for the management of the cT1a renal mass when intervention is indicated. In this setting, PN minimizes the risk of CKD or CKD progression and is associated with favorable oncologic outcomes, including excellent local control. (Moderate Recommendation; Evidence Level: Grade B)     Physicians should prioritize nephron-sparing approaches for patients with solid or Bosniak 3/4 complex cystic renal masses and an anatomic or functionally solitary kidney, bilateral tumors, known familial RCC, preexisting CKD, or proteinuria. (Moderate Recommendation; Evidence Level: Grade C)     Physicians should consider nephron-sparing approaches for patients with solid or Bosniak 3/4 complex cystic renal masses who are young, have multifocal masses, or comorbidities that are likely to impact renal function in the future, such as moderate to severe hypertension, diabetes mellitus, recurrent urolithiasis, or morbid obesity.  (Conditional Recommendation; Evidence Level: Grade C)     In patients who elect PN, physicians should prioritize  preservation of renal function through efforts to optimize nephron mass preservation and avoidance of prolonged warm ischemia. (Expert Opinion)      For patients undergoing PN, negative surgical margins should be a priority. The extent of normal parenchyma removed should be determined by surgeon discretion taking into account the clinical situation, tumor characteristics including growth pattern, and interface with normal tissue. Tumor enucleation should be considered in patients with familial RCC, multifocal disease, or severe CKD to optimize parenchymal mass preservation. (Expert Opinion)            Radical Nephrectomy (RN)     Physicians should consider RN for patients with a solid or Bosniak 3/4 complex cystic renal mass where increased oncologic potential is suggested by tumor size, RMB, and/or imaging characteristics and in whom active treatment is planned. (Conditional Recommendation; Evidence Level: Grade B) In this setting, RN is preferred if all of the following criteria are met: 1) high tumor complexity and PN would be challenging even in experienced hands; 2) no preexisting CKD or proteinuria; and 3) normal contralateral kidney and new baseline eGFR will likely be greater than 45 ml/min/1.73m2. (Expert Opinion)              Surgical Principles     For patients who are undergoing surgical excision of a renal mass with clinically concerning regional lymphadenopathy, physicians should perform a lymph node dissection for staging purposes. (Expert Opinion)     For patients who are undergoing surgical excision of a renal mass, physicians should perform adrenalectomy if imaging and/or intraoperative findings suggest metastasis or direct invasion of the adrenal gland. (Clinical Principle)     In patients undergoing surgical excision of a renal mass, a minimally invasive approach should be considered when it would not compromise oncologic, functional and perioperative outcomes. (Expert Opinion)     Pathologic  evaluation of the adjacent renal parenchyma should be performed after PN or RN to assess for possible intrinsic renal disease, particularly for patients with CKD or risk factors for developing CKD. (Clinical Principle)           Thermal Ablation (TA)     Physicians should consider thermal ablation (TA) as an alternate approach for the management of cT1a renal masses <3 cm in size.  For patients who elect TA, a percutaneous technique is preferred over a surgical approach whenever feasible to minimize morbidity. (Conditional Recommendation; Evidence Level: Grade C)     Both radiofrequency ablation and cryoablation are options for patients who elect thermal ablation. (Conditional Recommendation; Evidence Level: Grade C)     A renal mass biopsy should be performed prior to ablation to provide pathologic diagnosis and guide subsequent surveillance. (Expert Opinion)     Counseling about thermal ablation should include information regarding an increased likelihood of tumor persistence or local recurrence after primary thermal ablation relative to surgical extirpation, which may be addressed with repeat ablation if further intervention is elected. (Strong Recommendation; Evidence Level: Grade B)           Active Surveillance (AS)     For patients with small solid or Bosniak 3/4 complex cystic renal masses, especially those <2cm, AS is an option for initial management. (Conditional Recommendation; Evidence Level: Grade C)     For patients with a solid or Bosniak 3/4 complex cystic renal mass, physicians should prioritize active surveillance/expectant management when the anticipated risk of intervention or competing risks of death outweigh the potential oncologic benefits of active treatment. (Clinical Principle)     For patients with a solid or Bosniak 3/4 complex cystic renal mass in whom the risk/benefit analysis for treatment is equivocal and who prefer AS, physicians should repeat imaging in 3-6 months to assess for  interval growth and may consider RMB for additional risk stratification. (Expert Opinion)     For patients with a solid or Bosniak 3/4 complex cystic renal mass in whom the anticipated oncologic benefits of intervention outweigh the risks of treatment and competing risks of death, physicians should recommend active treatment. In this setting, AS with potential for delayed intervention may be pursued only if the patient understands and is willing to accept the associated oncologic risk. (Moderate Recommendation; Evidence Level: Grade C)               Please e-mail back through \"[facility] My Chart\" or contact our office by telephone if you have questions.  Please use telephone contact if you have an urgent question or problem rather than e-mail.    Thank you,    [facility]  Office [phone]        Types of renal masses  Benign or noncancerous masses such as oncocytomas, angiomyolipomas, renal abscess and benign complex renal cysts.  Approximately 20% of small (< 3 cm) renal massed are benign.    Renal cell carcinomas: This is the most common form of adult kidney cancer and has multiple subtypes including clear cell, papillary, chromophobe, collecting duct and sarcomatoid malignancies. These subtypes of renal cell carcinoma have different prognosis and sometimes different treatments. Complex renal cysts can also contain renal cell carcinomas, particularly as they become more complex reflected in a radiologic classification of Bosniak class III or class IV cysts.    Upper tract urothelial malignancies: These cancers are microscopically similar to bladder cancers and involve the ureter or renal pelvis which is the part of the kidney where the urine collects and drains to the bladder.    Additionally, there are a multitude of benign and malignant less common masses which can involve the kidney such as lymphomas, carcinoid tumors, metastasis from other malignancies or adult Wilms tumor.      Etiology/causes of  kidney cancers  Tobacco use and obesity are the commonly identified risk factors for kidney cancer. Family history is also associated with increased risk of renal cancer with inherited forms of renal cancer accounting for 2-4% of cases. We may involve a genetic counselor in your care if you have a family history of kidney cancer or develop kidney cancer at a young age.      Symptoms  Kidney cancer has few symptoms at early stages. Greater than 50% of renal masses are detected incidentally.  For example, patients have an ultrasound or CT scan for an unrelated reason and a renal mass is identified.  Later symptoms may include blood in the urine, flank pain, abdominal mass or symptoms associated with spread/metastasis of kidney cancer such as weight loss, cough, fatigue, anemia, high blood pressure and shortness of breath      Diagnosis  In addition to history and physical examination additional laboratory and imaging studies may be indicated for evaluation of renal masses.    Urinalysis evaluates for the presence of blood in the urine which may be associated with renal masses.    Blood tests include a complete blood count, comprehensive metabolic panel and LDH.  These test evaluate for conditions which may be associated with renal masses such as anemia, kidney failure and liver dysfunction.      Radiology imaging tests include:  Computerized axial tomography/CT scan which utilizes x-rays and advanced computer technology to obtain a detailed evaluation of the chest, abdomen and/or pelvis    Magnetic resonance imaging/ MRI which uses a magnet and advanced computer technology to obtain detailed body images. This modality does not involve the use of x-rays    Ultrasound uses high frequency sound waves transmitted through body tissues. Ultrasound imaging proves helpful in detecting kidney tumors because they oftentimes have a different ultrasound density than normal healthy kidney tissue.  Even when a solid kidney mass is  identified on CT or MRI an ultrasound is often obtained to help with surgical management of kidney tumors in that approximately 1% of kidney tumors are isoechoic to the kidney meaning they appear the same as normal kidney tissue.    Nuclear medicine bone scan, nuclear medicine renal scan, brain imaging with CT or MRI and chest CT are sometimes obtained based on patient's symptoms and size of the tumor.    A chest x-ray or chest CT is obtained in patients with renal masses to exclude spread of tumor to the long    Renal mass biopsy performed by a radiologist may be indicated in certain circumstances particularly patients considering active surveillance for there renal mass or patients with tumor suggestive of lymphoma, abscess or metastasis      Treatment options for kidney cancer  Radical nephrectomy: This operation removes the entire kidney. Removal of the entire kidney is indicated for larger masses particularly those which invade major venous structures such as the kidney vein or inferior vena cava as well as masses in a location in the kidney which do not lend themselves to preserving a well functioning kidney.    Radical nephroureterectomy:  This operation involves removal of the entire kidney and ureter along with a small cuff of bladder. This surgery is indicated for upper tract urothelial malignancy. Small upper tract urothelial malignancies can sometimes be managed with a ureteroscopic approach allowing the kidney to be preserved    Partial nephrectomy: This operation involves removing only the part of the kidney which is involved by the mass. The advantage of this approach includes better preservation of kidney function with the same chance of cure as radical nephrectomy in selected patients.  There is evidence that having better kidney function and avoiding the development of chronic kidney disease/end-stage renal disease which and dialysis leads to better overall health and longer survival.  This is my  preferred approach in the vast majority of tumors less than 4 cm and is oftentimes applicable to tumors which are larger than 4 cm.     Surgical approach to both radical and partial nephrectomy can involve open surgery, laparoscopic surgery or robot-assisted laparoscopic surgery.   The vast majority of these operations can be done using a laparoscopic or robot-assisted laparoscopic surgical approach which allows smaller incisions, less pain and a more rapid return to usual activities.  The advantages of robot assisted laparoscopic surgery include improved visualization, more precise dissection and the ability to sew more rapidly than with pure laparoscopic surgery which is an important component to success with partial nephrectomy.      Risk of radical and partial nephrectomy include:  Bleeding which may require blood transfusion  Infection which may require antibiotic administration and/or additional procedures  Injury to organs associated with the kidney such as bowel, pancreas, spleen, liver, gall bladder or major blood vessels  Ileus or bowel obstruction which involves a delay in return of bowel function after surgery and may involve additional surgery  Hernia formation  Chronic pain related to surgery - this is more common when an open flank incision approach to renal surgery is utilized  Chylous ascites which involves a leak of lymph fluid from removal of lymph nodes  Respiratory issues such as pneumonia or pulmonary embolus  Death - there is a small risk of death with any surgery     Medical complications such as deep venous thrombosis /blood clot, pulmonary embolus, heart attack or pneumonia can occur - we will ask you to have preoperative clearance with your primary care physician prior to surgery to minimize the risk of these complications and will oftentimes involve a hospitalist/internal medicine physician in your care after surgery to assist in your postoperative recovery.    Risks which are unique to  partial nephrectomy include:  Urine leak from the site of the partial nephrectomy where the tumor is removed can develop. This can usually be managed with placement of a ureteral stent from the bladder to the kidney and placement of a catheter into the bladder but may involve the need for additional surgeries or drain placement.    Delayed bleeding from the kidney at the site of the partial nephrectomy related to a pseudo-aneurysm formation.  This may involve an additional procedure performed by an interventional radiologist to stop the bleeding    Need to convert from a planned partial nephrectomy to a radical nephrectomy based on findings at surgery. Specifically complete tumor excision may not be possible with partial nephrectomy or the remaining kidney may not be adequate to leave in place.    Risks which are unique to laparoscopic or robot-assisted laparoscopic surgery include the need to convert to an open operation based on intraoperative findings          Ablative therapies include cryoablation and radiofrequency ablation. These treatments involve placing a probe into the kidney mass and using thermal injury to treat the mass. Ablative therapies can be accomplished using either laparoscopic or percutaneous approaches.  Advantages include a less invasive approach when this is done percutaneously (through the skin) by the interventional radiologist. Disadvantages include a lower chance of controlling the mass in the kidney as well as an apparent lower chance of minimizing the risk of spread of a cancer to other organs as compared to partial or radical nephrectomy. This treatment is oftentimes limited to patients who are not candidates for a surgical approach based on current guidelines but is an option for all patients with small renal masses. Additionally,in patients who undergo ablative therapies and require subsequent salvage surgery partial nephrectomy and laparoscopic surgery may not be possible due to  extensive reactive fibrosis around the kidney.    Active surveillance involves observation of small renal masses.  This approach is generally reserved for elderly patients with limited life expectancy, but it can be an approach utilized in all patients with small renal masses.  The definition of small renal mass is somewhat controversial but generally includes masses less than 3-4 cm. The advantage of this approach is avoiding the risk of surgical intervention. The disadvantage is the potential for the mass to grow and/or metastasize. The risk of metastasis in masses less than 3 cm is felt to be less than 1%, but currently there is no treatment that allows cure for metastatic renal cancer.  Periodic follow-up imaging of the kidney mass and chest x-ray to look for lung metastasis is indicated in patients on active surveillance.    Nonsurgical treatments include a variety of systemic therapies including molecular targeted therapies.  These drugs are generally used in patients who have metastatic kidney cancer or high risk localized kidney cancer.  Examples of these drugs include Sutent, sorafenib, temsorilimus, everolimus, pazopanib, no Mayer mad and ipilimumab.  Additionally, interleukin-2 may be utilized. These drugs would be administered by a medical oncologist who we may involve in your care.    Radiation therapy is rarely indicated for the treatment of kidney cancer but there is some ongoing investigation regarding its use with newer technologies such as CyberKnife. Additionally, there is research in other ablative therapies such as high-intensity focused ultrasound, microwave thermotherapy and laser interstitial thermal therapy.  These treatments are considered investigational at this point.      Multidisciplinary/team approach to renal masses  We may present your case at the [facility] multidisciplinary urology tumor conference to obtain the input of physicians in other specialties as well as other urologists.  Additionally, other specialist may be utilized in your care including an oncologist, nephrologist, vascular or transplant surgeon, genetic counselor, cardiovascular surgeon, surgical oncologist or radiation oncologist depending on an individual patient's situation        Preparation for surgery and suggestions regarding recovery if you have surgery for your renal mass:    Before surgery:  You will need to see your primary care physician to have a history and physical examination prior to surgery    You'll need preoperative blood work, EKG and chest x-ray and additional testing may be indicated based on results of these studies and your history    If you smoke we would strongly encourage smoking cessation prior to surgery and we'll be glad to provide resources to assist in this process    Decision making regarding management of any blood thinners need to be made by the prescribing physician of those medications    Hospital stay:  We have an enhanced recovery pathway with a target discharge date of 1 day following surgery. For example, if you have surgery on Thursday we will target Friday as a day for discharge home. Some patients will need a longer stay. Ability to consume fluids and have adequate pain control are the main barriers to discharge home. Doing a good job with walking after surgery and minimizing narcotic consumption will speed recovery. You will have a physical therapist and occupational therapist to work with you in the hospital to assist with resuming normal activities.    Once you go home we would encourage frequent small feedings rather than heavy meals. I would encourage no lifting over 10 pounds for one month following the surgery. I would encourage continued frequent ambulation. You should not drive while you are taking narcotics and you should not drive until you feel fully recovered from surgery.      Please also review the handout Surgery at [facility]: Enhanced  recovery        Home Care after Surgery  Here’s what to do at home after surgery for kidney mass.  Incision Care  Shower as desired. But don’t swim or use a bathtub or hot tub until your doctor says it’s okay.  Keep your incision clean and dry.  Wash your incision gently with mild soap and warm water. Pat it dry. Avoid scrubbing the incision.  Activity  Don’t worry if you feel more tired than usual. Fatigue and weakness are common for a few weeks after this surgery.  Listen to your body. If an activity causes pain, stop.  Limit your activity to short walks. Gradually increase your pace and distance as you feel able.  Avoid strenuous activities, such as mowing the lawn, using a vacuum , or playing sports.  Don’t lift anything heavier than 10 pounds for 4 weeks.  Don’t drive until you are free of pain and no longer taking narcotic pain medications. This may take 2-4 weeks.  Diet  Eat a healthy, well-balanced diet.  Avoid constipation.   Eat fruits, vegetables, and whole grains.   Drink 6 to 8 glasses of water a day unless your doctor tells you to limit fluids.   Use a laxative or a mild stool softener if your doctor says it’s okay.       Follow-Up  Make a follow-up appointment as directed by our staff - 1-3 weeks after surgery      When to Call Your Doctor   Call your doctor right away if you have any of the following:  Fever of 100.4°F or higher, or chills  Signs of infection around the incision (redness, drainage, warmth, pain)  Nausea or vomiting  Increased pain  Noticeable decrease in urine output  Blood in your urine  Persistent diarrhea           Long-term follow-up if a kidney cancer is identified will be dictated by the final pathology findings (stage and grade), but will involve periodic history, physical examination, lab tests and imaging of the abdomen and chest. Additional information will be provided once final results are known.      Healthy lifestyle recommendations that may reduce the risk of  cancer:    Maintain ideal body mass index  Avoid smoked and cured meats  5+ servings of fruits and vegetables per day  Avoid simple sugars  Plant based diet  No tobacco use  Avoid binge drinking and daily usage of alcohol (At least 2-3 days per week with no alcohol and on other days 1-2 standard drinks)  Exercise 150 minutes per week if okay with your primary care physician  Manage stress which may include use of meditation, yoga, prayer  Diet that include whole grains and low in saturated/trans fat   Continue careful follow-up with your primary care physician and other members your medical team and consider regular screening for other cancers  Maintain adequate vitamin-D level          Information from National Cancer Care Network available at NCCN.org                Information from national cancer care network website regarding active surveillance (nccn.org):          Information from American urologic Association regarding active surveillance        Information from American Urologic Association available at auanet.org    Guideline Statements    Evaluation and Diagnosis    In patients with a solid or complex cystic renal mass, physicians should obtain high quality, multiphase, cross-sectional abdominal imaging to optimally characterize and clinically stage the renal mass. Characterization of the renal mass should include assessment of tumor complexity, degree of contrast enhancement (where applicable), and presence or absence of fat. (Clinical Principle)    In patients with suspected renal malignancy, physicians should obtain comprehensive metabolic panel, complete blood count, and urinalysis. Metastatic evaluation should include chest imaging to evaluate for possible thoracic metastases. (Clinical Principle)    For patients with a solid or complex cystic renal mass, physicians should assign CKD stage based on GFR and degree of proteinuria. (Expert Opinion)        Counseling    In patients with a solid or  Bosniak 3/4 complex cystic renal mass, a urologist should lead the counseling process and should consider all management strategies. A multidisciplinary team should be included when necessary. (Expert Opinion)    Physicians should provide counseling that includes current perspectives about tumor biology and a patient-specific risk assessment inclusive of sex, tumor size/complexity, histology (when obtained), and imaging characteristics.  For cT1a tumors, the low oncologic risk of many small renal masses should be reviewed. (Clinical Principle)    During counseling of patients with a solid or Bosniak 3/4 complex cystic renal mass, physicians must review the most common and serious urologic and non-urologic morbidities of each treatment pathway and the importance of patient age, comorbidities/frailty, and life expectancy.   (Clinical Principle)    Physicians should review the importance of renal functional recovery related to renal mass management, including the risk of progressive CKD, potential short- or long-term need for renal replacement therapy, and long-term overall survival considerations. (Clinical Principle)    Physicians should consider referral to nephrology in patients with a high risk of CKD progression. Such patients may include those with eGFR less than 45 ml/min/1.73m2, confirmed proteinuria, diabetics with preexisting CKD,  or whenever eGFR is expected to be less than 30 ml/min/1.60c6rgnxt intervention. (Expert Opinion)    Physicians should recommend genetic counseling for all patients ? 46 years of age with renal malignancy and consider genetic counseling for patients with multifocal or bilateral renal masses, or if personal or family history suggests a familial renal neoplastic syndrome. (Expert Opinion)        Renal Mass Biopsy (RMB)    Renal mass biopsy should be considered when a mass is suspected to be hematologic, metastatic, inflammatory, or infectious. (Clinical Principle)    In the setting  of a solid renal mass, RMB is not required for: 1) young or healthy patients who are unwilling to accept the uncertainties associated with RMB; or 2) older or frail patients who will be managed conservatively independent of RMB findings. (Expert Opinion)    When considering the utility of RMB, patients should be counseled regarding rationale, positive and negative predictive values, potential risks and non-diagnostic rates of RMB. (Clinical Principle)    For patients with a solid renal mass who elect RMB, multiple core biopsies are preferred over fine needle aspiration. (Moderate Recommendation; Evidence Level: Grade C)  Management          Partial Nephrectomy (PN) and Nephron-Sparing Approaches    Physicians should prioritize PN for the management of the cT1a renal mass when intervention is indicated. In this setting, PN minimizes the risk of CKD or CKD progression and is associated with favorable oncologic outcomes, including excellent local control. (Moderate Recommendation; Evidence Level: Grade B)    Physicians should prioritize nephron-sparing approaches for patients with solid or Bosniak 3/4 complex cystic renal masses and an anatomic or functionally solitary kidney, bilateral tumors, known familial RCC, preexisting CKD, or proteinuria. (Moderate Recommendation; Evidence Level: Grade C)    Physicians should consider nephron-sparing approaches for patients with solid or Bosniak 3/4 complex cystic renal masses who are young, have multifocal masses, or comorbidities that are likely to impact renal function in the future, such as moderate to severe hypertension, diabetes mellitus, recurrent urolithiasis, or morbid obesity.  (Conditional Recommendation; Evidence Level: Grade C)    In patients who elect PN, physicians should prioritize preservation of renal function through efforts to optimize nephron mass preservation and avoidance of prolonged warm ischemia. (Expert Opinion)     For patients undergoing PN,  negative surgical margins should be a priority. The extent of normal parenchyma removed should be determined by surgeon discretion taking into account the clinical situation, tumor characteristics including growth pattern, and interface with normal tissue. Tumor enucleation should be considered in patients with familial RCC, multifocal disease, or severe CKD to optimize parenchymal mass preservation. (Expert Opinion)         Radical Nephrectomy (RN)    Physicians should consider RN for patients with a solid or Bosniak 3/4 complex cystic renal mass where increased oncologic potential is suggested by tumor size, RMB, and/or imaging characteristics and in whom active treatment is planned. (Conditional Recommendation; Evidence Level: Grade B) In this setting, RN is preferred if all of the following criteria are met: 1) high tumor complexity and PN would be challenging even in experienced hands; 2) no preexisting CKD or proteinuria; and 3) normal contralateral kidney and new baseline eGFR will likely be greater than 45 ml/min/1.73m2. (Expert Opinion)          Surgical Principles    For patients who are undergoing surgical excision of a renal mass with clinically concerning regional lymphadenopathy, physicians should perform a lymph node dissection for staging purposes. (Expert Opinion)    For patients who are undergoing surgical excision of a renal mass, physicians should perform adrenalectomy if imaging and/or intraoperative findings suggest metastasis or direct invasion of the adrenal gland. (Clinical Principle)    In patients undergoing surgical excision of a renal mass, a minimally invasive approach should be considered when it would not compromise oncologic, functional and perioperative outcomes. (Expert Opinion)    Pathologic evaluation of the adjacent renal parenchyma should be performed after PN or RN to assess for possible intrinsic renal disease, particularly for patients with CKD or risk factors for developing  CKD. (Clinical Principle)        Thermal Ablation (TA)    Physicians should consider thermal ablation (TA) as an alternate approach for the management of cT1a renal masses <3 cm in size.  For patients who elect TA, a percutaneous technique is preferred over a surgical approach whenever feasible to minimize morbidity. (Conditional Recommendation; Evidence Level: Grade C)    Both radiofrequency ablation and cryoablation are options for patients who elect thermal ablation. (Conditional Recommendation; Evidence Level: Grade C)    A renal mass biopsy should be performed prior to ablation to provide pathologic diagnosis and guide subsequent surveillance. (Expert Opinion)    Counseling about thermal ablation should include information regarding an increased likelihood of tumor persistence or local recurrence after primary thermal ablation relative to surgical extirpation, which may be addressed with repeat ablation if further intervention is elected. (Strong Recommendation; Evidence Level: Grade B)        Active Surveillance (AS)    For patients with small solid or Bosniak 3/4 complex cystic renal masses, especially those <2cm, AS is an option for initial management. (Conditional Recommendation; Evidence Level: Grade C)    For patients with a solid or Bosniak 3/4 complex cystic renal mass, physicians should prioritize active surveillance/expectant management when the anticipated risk of intervention or competing risks of death outweigh the potential oncologic benefits of active treatment. (Clinical Principle)    For patients with a solid or Bosniak 3/4 complex cystic renal mass in whom the risk/benefit analysis for treatment is equivocal and who prefer AS, physicians should repeat imaging in 3-6 months to assess for interval growth and may consider RMB for additional risk stratification. (Expert Opinion)    For patients with a solid or Bosniak 3/4 complex cystic renal mass in whom the anticipated oncologic benefits of  intervention outweigh the risks of treatment and competing risks of death, physicians should recommend active treatment. In this setting, AS with potential for delayed intervention may be pursued only if the patient understands and is willing to accept the associated oncologic risk. (Moderate Recommendation; Evidence Level: Grade C)          Renal Mass Information  You have been found to have a renal mass.  I wanted to provide you with some written information to consider after our discussion today.     My plan for you includes a radical nephrectomy.      Please do not hesitate to contact me if you have any questions.    Thank you,    Khang Cormier MD, FACS    [facility]  Office [phone]  You have been found to have a renal mass or complex renal cyst.  Management options were discussed today.  Options for management include:  Observation, surgery and percutaneous ablation.  The surgical options reviewed are:  Partial nephrectomy, radical (total) nephrectomy and percutaneous ablation.  Surgery can be accomplished using open surgical technique with a large incision or minimally invasive approaches using robotic assisted laparoscopic surgery.  We discussed how we make management recommendations based on tumor size, overall renal function, split renal function, evidence of tumor spread and other medical problems.  It is not a certainty that all renal masses contain carcinoma. Probability of a carcinoma in a given renal mass is based on multiple variables including size and characteristics of the mass on imaging studies.      RADICAL NEPHRECTOMY:  Open and robot assisted laparoscopic radical nephrectomy was discussed in detail.  Specific risks include but are not limited to:  Bleeding, infection, bowel injury, pancreatitis, ileus, bowel obstruction, splenic injury, injury to vascular structures, chronic pain, conversion from a laparoscopic to an open nephrectomy and postoperative renal insufficiency or  failure.  The operative approach including intended incision sites and anesthetic concerns were shared. The possible need for nephroureterectomy was discussed.  General risks of surgery and postoperative convalescence were reviewed.  There is a chance of local and metastatic recurrence and no guarantee of cure can be provided.  The operative approach including intended incision site and anesthetic concerns were shared.  In the case of robotic assisted laparoscopic radical nephrectomy the potential need to convert to an open operation is explained.  The nature of flank bulge and chronic flank pain is discussed.  The need for long-term follow-up was emphasized.  The potential need for blood transfusion is discussed.  Potential medical complications including but not limited to deep venous thrombosis, pulmonary embolus and myocardial infarction were reviewed.  Questions were answered        Answers to frequently asked questions:    We have an enhanced recovery pathway with a target discharge date of 2 days following surgery.  For example, if you have surgery on Thursday, we will target Saturday as a day for discharge home.  Some patients may be ready for discharge home one day following surgery and other patients will need a longer stay.  Ability to consume fluids and have adequate pain control are the main barriers to discharge home.  Doing a good job with walking after surgery and minimizing narcotic consumption will speed recovery.  You will have a physical therapist and occupational therapist to work with you in the hospital to assist with resuming normal activities.    Once you go home I would encourage frequent small feedings rather than heavy meals.  I would encourage no lifting over 10 pounds for one month following the surgery.  I would encourage continued frequent ambulation.  You should not drive while you are taking narcotics and you should not drive until you feel fully recovered from  surgery.          Please also review the handout Surgery [facility]:  Enhanced recovery    Discharge Instructions following surgery  Home Care after Surgery  Here’s what to do at home after surgery for kidney mass.  Incision Care  Shower as desired.  But don’t swim or use a bathtub or hot tub until your doctor says it’s okay.  Keep your incision clean and dry.  Wash your incision gently with mild soap and warm water. Pat it dry. Avoid scrubbing the incision.  Activity  Don’t worry if you feel more tired than usual. Fatigue and weakness are common for a few weeks after this surgery.  Listen to your body. If an activity causes pain, stop.  Limit your activity to short walks. Gradually increase your pace and distance as you feel able.  Avoid strenuous activities, such as mowing the lawn, using a vacuum , or playing sports.  Don’t lift anything heavier than 10 pounds for 4 weeks.  Don’t drive until you are free of pain and no longer taking narcotic pain medications. This may take 2-4 weeks.  Diet  Eat a healthy, well-balanced diet.  Avoid constipation.   Eat fruits, vegetables, and whole grains.   Drink 6 to 8 glasses of water a day unless your doctor tells you to limit fluids.   Use a laxative or a mild stool softener if your doctor says it’s okay.   Follow-Up  Make a follow-up appointment as directed by our staff.  When to Call Your Doctor   Call your doctor right away if you have any of the following:  Fever of 100.4°F or higher, or chills  Signs of infection around the incision (redness, drainage, warmth, pain)  Nausea or vomiting  Increased pain  Noticeable decrease in urine output  Blood in your urine  Persistent diarrhea       © 0130-1733 [facility], [address] [place] [address], [place] [address]. All rights reserved. This information is not intended as a substitute for professional medical care. Always follow your healthcare professional's instructions.  What Is Kidney (Renal)  Cancer?  Cancer occurs when cells in the body begin changing and multiplying out of control. These cells can form lumps of tissue called tumors. Cancer that starts in a kidney is called kidney or renal cancer.    Understanding the Kidneys  The kidneys are bean-shaped organs about the size of a bar of soap. They are found in the low back area, one on each side of the spine. The kidneys help keep the body alive by filtering waste and excess fluid from the blood. The kidneys send this liquid and waste (urine) to the bladder through the ureters. Urine then leaves the body through the urethra.  When Kidney Cancer Forms  Kidney cancer forms when cells in the kidney change and multiply abnormally. The cancer can interfere with the working of the kidneys. Kidney cancer may spread beyond the kidneys to other parts of the body. This spread is called metastasis. The more cancer spreads, the harder it is to treat.  Treatment Options for Kidney Cancer  You and your healthcare provider will discuss a treatment plan that's best for your needs. Treatment options may include:  Surgery to remove the cancerous kidney and, in some cases, surrounding tissue.  Radiation therapy, which uses directed rays of energy to kill cancer cells.  Chemotherapy, which uses strong medications to kill cancer cells.  Immunotherapy, which strengthens the body's own immune system to help fight cancer.  © 8697-9001 [facility], [address] [place] [address], [place] [address]. All rights reserved. This information is not intended as a substitute for professional medical care. Always follow your healthcare professional's instructions.    Home  Back  SP    CHRONIC RENAL FAILURE    The role of the kidneys is to remove waste products and excess water from the blood.  When the kidneys do not function normally and waste products begin to build up in the blood, this is called  “renal insufficiency”. When it is advanced, it is called “chronic renal failure” or  “end-stage renal disease”. Chronic renal failure allows excess water, waste and toxic substances to build up in the body. This can eventually become life-threatening, requiring dialysis or a kidney transplant to stay alive.  Diabetes is the leading causes of chronic renal failure. Other causes include high blood pressure, hardening of the arteries (atherosclerosis), lupus, inflammation of the blood vessels (vasculitis), prior viral and bacterial infections, and others. Certain over-the-counter pain medicines can cause renal failure when taken often over a long period of time. These include aspirin, ibuprofen (Advil, Motrin) and related anti-inflammatory medicines.  HOME CARE:  If you have diabetes, talk to your doctor about the quality of your blood sugar control and any adjustments needed to your diet.  If you have high blood pressure:  Take prescribed medicine to lower your blood pressure to normal (130/80 mm Hg).    Take up a regular exercise program that you enjoy. Check with your doctor to be sure your planned exercise program is right for you.   Reduce your salt (sodium) intake. Your doctor can tell you how much salt per day is safe for you.   If you are overweight, talk to your doctor about a weight loss plan.  If you smoke, you must quit.  Smoking worsens kidney disease. Talk to your doctor about ways to help you quit.  For more information, visit the following links:  www.smokefree.gov/pubs/clearing_the_air.pdf   www.smokefree.gov   www.quitnet.com   All patients with chronic renal failure need to follow a special diet.  Be sure you understand yours. In general, you will need to restrict protein, salt, potassium and phosphorus. You also need to limit fluid intake. A calcium supplement will be prescribed to protect your bones from osteoporosis.  Avoid the following over the counter medicines, or consult your doctor before using:  Aspirin and anti-inflammatory drugs such as ibuprofen (Advil, Motrin), naprosyn  (Aleve); [Short term use of acetaminophen (Tylenol) for fever or pain is okay.]   Laxatives and antacids containing magnesium or aluminum (Mylanta, Maalox)   Fleet or phosphosoda enemas containing phosphorus   Certain stomach acid-blocking medicine such as cimetidine (Tagamet), ranitidine (Zantac)   Decongestants containing pseudoephedrine (such as some forms of Sudafed or Actifed)   Herbal supplements   FOLLOW UP with your doctor or as advised by our staff. Contact one of the following for more information.  American Association of Kidney Patients  [phone] www.aakp.org  National Kidney Foundation [phone] [phone] www.kidney.org  [NOTE: If an X-ray or EKG (cardiogram) was made, another specialist will review it. You will be notified of any new findings that may affect your care.]  RETURN PROMPTLY or contact your doctor if any of the following occurs:  Nausea or vomiting  Severe weakness, dizziness, fainting, drowsiness or confusion  Chest pain or shortness of breath  Unexpected weight gain or swelling in the legs, ankles or around the eyes  Heart beating fast, slow or irregularly  Decrease or absent urine output  © 5418-0570 [facility], [address] [place] [address], [place] [address]. All rights reserved. This information is not intended as a substitute for professional medical care. Always follow your healthcare professional's instructions.Home  Back  SP    DIET FOR CHRONIC KIDNEY DISEASE  Following a special diet when you have kidney disease can help you stay as healthy as possible. A special diet plan should be made just for you by your doctor or a dietitian.    Here are some good eating rules:  PROTEIN: High-protein diets put a strain on the kidneys. Eating less protein may slow the progression of chronic kidney disease. Foods high in protein include meat, fish, eggs, cheese and other dairy products. A registered dietitian can help you plan a diet that contains an appropriate amount of protein for  Statement Selected you.  SALT: Excess salt in your diet causes you to retain water. Use no more than one half teaspoon of salt (or 1,500 mg of sodium) a day to avoid fluid retention and help control high blood pressure. Learn to read food labels to know how much sodium is in one serving. Foods high in salt include smoked meats and fish, processed foods, salted chips and snacks, and pickled foods.  FLUIDS: You will need to limit the water and fluids you drink, if you have advanced kidney disease. Otherwise, excess water builds up in the body. The exact amount of fluid that you can drink depends on your kidney function. Ask your doctor how much water you can safely drink each day.  POTASSIUM: In advanced kidney disease, your potassium level can go dangerously high. This affects the heart and can cause arrhythmia (irregular heartbeat). Limit your intake of potassium-rich foods such as artichokes, beans (white, red, and noble), bananas, potatoes, spinach, cantaloupe, honeydew melon, cod, flounder, halibut, salmon, and scallops.  CALCIUM: Take calcium supplements to help keep your bones healthy. Ask your doctor how much calcium you should take each day.  PHOSPHORUS: In advanced kidney disease, your phosphorus level can go dangerously high. This affects many systems in the body and can become very dangerous. Limit your intake of phosphorus-rich foods such as eggs, beans, peas, nuts, cocoa, beer, cola drinks, and dairy products.  © 8748-8448 [facility], [address] [place] [address], [place] [address]. All rights reserved. This information is not intended as a substitute for professional medical care. Always follow your healthcare professional's instructions.Discharge Instructions for Chronic Kidney Disease  You have been diagnosed with chronic kidney disease, which can result from problems such as infections, diabetes, high blood pressure, kidney stones, circulation problems, and reactions to medication. Having kidney disease means making  many changes in your life. Learn as much as you can about it so that you can better adjust to these changes. Here are some things you can do to help your condition.  Diet Changes  Cut back on salt.   Limit canned, dried, packaged, and fast foods.   Don’t add salt to your food at the table.   Season foods with herbs instead of salt when you cook.   Reduce the potassium in your diet, as instructed.   Use bread and cereal items that are not whole-grain.    Avoid nuts, peanut butter, dried beans, and peas.   Avoid salt substitutes, such as Mrs. Daugherty, Ball Salt Substitute, and AlsoSalt.   Cut back on protein. Eat less meat, milk products, yogurt, eggs, and cheese.   Eat small, frequent meals that are high in fiber and calories.  Other Home Care  Avoid wearing yourself out or becoming overly fatigued.  Get plenty of rest and increase the amount of sleep you get at night.  Move around and bend your legs to avoid getting blood clots when you rest for a long period of time.  Weigh yourself every day. Do this at the same time of day and in the same kind of clothes. Keep a record of your daily weights.  Take your medications exactly as directed.  Keep all medical appointments.  Take steps to control high blood pressure or diabetes. Talk to your doctor for advice.  Talk to your doctor about dialysis. You may benefit from this procedure.  Follow-Up  Make a follow-up appointment as directed by our staff.      When to Call Your Doctor  Call your doctor right away if you have any of the following:  Trouble eating or drinking  Weight loss of more than 2 pounds in 24 hours or more than 5 pounds in 7 days  Little or no urine output  Trouble breathing  Muscle aches  Fever of 100.4°F or higher, or chills  Blood in your urine or stool  Bloody discharge from your nose, mouth, or ears  Severe headache or a seizure  Vomiting  Swelling of legs or ankles  Chest pain (call 911)    © 5538-4937 [employer], [address] [place] [address]  [place] [address]. All rights reserved. This information is not intended as a substitute for professional medical care. Always follow your healthcare professional's instructions.Home  Back    eGFR Test  At a Glance  Formal Name  Estimated Glomerular Filtration Rate  Related tests  GFR measured by inulin clearance (see FAQ), Creatinine, Creatinine clearance, Microalbumin, Cystatin C, Urine protein  Were you looking for Epidermal Growth Factor Receptor, also known as EGFR?  Why get tested?  To assess kidney function  When to get tested?  If your doctor thinks that you may have kidney damage  Sample required  The eGFR is a calculated estimate of the actual glomerular filtration rate and is based on your serum creatinine concentration, which requires a blood sample from a vein in your arm; the calculation uses a formula that also can include your age, gender, height, and weight; in some formulas, race may also be used in the calculation  Test Sample  What is being tested?  Glomerular filtration rate (GFR) is a measure of the function of your kidneys. Glomeruli are tiny filters in your kidney that allow waste products to be removed from the blood, while preventing loss of important constituents including proteins and blood cells. The rate refers to the amount of blood that is filtered per minute. Measuring GFR directly is challenging, so most often an estimate - the eGFR - is used.  How is the sample collected for testing?  A blood sample is taken by needle from a vein in the arm; depending on the formula used, your age, gender, race, height, and weight may also be needed.  The Test  How is it used?  A measured GFR is considered the most accurate way to detect changes in kidney status. If kidney damage is detected early, it may be possible to prevent worsening damage if this is due to high blood pressure, diabetes or to other treatable diseases. However, measuring GFR is complicated and requires experiences personnel. Because  eGFR can be calculated based on serum creatinine, an easily performed and commonly used laboratory test, it is possible to get a reasonable estimate of the actual GFR.  The creatinine clearance test also provides an estimate of renal function and of the actual GFR. However, in addition to the serum creatinine, this test requires a timed urine collection (24 hours) for creatinine measurement in order to calculate the clearance.  Another method of evaluating renal function involves the measurement of the serum level of a molecule called cystatin C. There is increasing interest in the use of this test for this purpose.  When is it ordered?  The eGFR can be determined, with no extra testing, at the same time that a sample is sent for creatinine measurement. The National Kidney Foundation (NKF) has recommended that it be calculated automatically every time a creatinine test is done. If you have had a creatinine measurement, you can calculate the eGFR yourself by using the calculator on the National Kidney Foundation website.  What does the test result mean?  Compared to serum creatinine, the eGFR more reliably detects kidney disease in its early stages. Because the calculation works best for estimating reduced renal function, the NKF suggests only reporting actual results once values are greater than 60 ml/min (normal values are  ml/min, according to the NKF). An eGFR below 60 ml/min suggests that some kidney damage has occurred. The NKF recommends that your eGFR result be interpreted in relation to your clinical history and presenting conditions.  The NKF has suggested that all persons \"know their GFR number.\" They recommend interpreting GFR (usually by eGFR) based on the following table:      Kidney Damage Stage  Description  GFR  Other findings    1  Kidney damage with normal or high GFR  90+  Protein or albumin in urine are high, cells or casts seen in urine    2  Mild decrease in GFR  60-89      3  Moderate  decrease in GFR  30-59      4  Severe decrease in GFR  15-29      5  Kidney failure  >15         Is there anything else I should know?  GFR and EGFR increase during pregnancy.  A measured clearance (GFR) rather than calculated (eGFR) is recommended for:  Persons with known kidney damage (for example as reflected by albumin or protein in the urine)  Patients of extreme age (very old or very young)  Patients of extreme body mass (obese, malnourished, with muscle wasting diseases)  Persons with unusual dietary intakes, including vegetarians  Persons with rapidly changing renal function (includes acute renal disease)  When drug dose adjustments are necessary (persons taking drugs with significant renal toxicity and renal clearance)  The most commonly used equation for calculating the eGFR, and the one recommended by the National Kidney Foundation for general use, is called the MDRD (Modification of Diet in Renal Disease study) equation. The simple version of this equation requires only the serum creatinine, your age and gender. It may be modified depending on your racial origin.  Common Questions  How can my GFR be determined?   Direct measurement of the GFR is complicated and requires experienced personnel. The most commonly used method for determining the GFR is the creatinine clearance method mentioned above. However, this is liable to errors of urine collection as well as to problems with testing methods. The best method for determining the GFR is a procedure called an \"inulin clearance.\" It is not routinely used, however, because it is expensive and awkward. It involves introducing a fluid containing the marker molecule inulin (NOT insulin) into your veins (IV - intravenous infusion) and then collecting timed urines over a period of hours. The urine volumes are noted and the inulin in each sample is measured to allow determination of the GFR. Other methods of determining GFR use radioactive markers and have similar  drawbacks. The National [employer] is now recommending several different predictive equations that are used in combination with serum creatinine levels to calculate the eGFR. The use of these equations may become widespread as doctors learn how to use them to monitor their patients.      ©3069-9672 American [employer] for [employer]    Kidney Disease: Avoiding High-Sodium Foods  Sodium is a mineral that the body needs in small amounts. When sodium intake is too high, it can increase thirst and cause the body to retain fluid. If you have kidney disease, try not to eat the foods listed here, unless the label states that they are made without salt.    Canned and processed foods, such as gravies, instant cereal, packaged noodles and potato mixes, olives, pickles, soups, vegetables  Cheeses, such as American, Blue, Parmesan, Roquefort  Cured meats, such as werner, beef jerky, bologna, corned beef, ham, hot dogs, sandwich meats, sausages  Fast foods, such as burritos, fish sandwiches, milk shakes, salted french fries, tacos  Frozen foods, such as meat pies, TV dinners, waffles  Salted snacks, such as chips, crackers, peanut popcorn, pretzels, and nuts  Other packaged items, such as antacids, baking soda, bouillon, catsup, lite salt, relish, salted butter and margarine, soy sauce, steak sauce, vegetable juices  © 6891-0039 [facility], [address] [place] [address], [place] [address]. All rights reserved. This information is not intended as a substitute for professional medical care. Always follow your healthcare professional's instructions.  Kidney Disease: Balancing Calcium and Phosphorus  Calcium and phosphorus are minerals found in many foods. Your body works best when these minerals are in balance. But if you have kidney disease, phosphorus may build up in your blood. This can weaken your bones over time. To help keep your bones strong, control the amount of phosphorus in your body. This sheet tells you  how.  Take Phosphate Binders  Phosphate binders are medications that stick to the phosphorus in the food you eat. This keeps the phosphorus from being absorbed into your body. Instead, the phosphorus passes from your body with stool (solid waste). For best results, keep these tips in mind:  Use only the type of phosphate binder that your health care provider recommends. The type of binder that you should be taking is ____________________  Always take phosphate binders as directed.    With meals    Other ____________________  Phosphate binders can cause constipation. You may need to eat more fiber or take stool softeners.  Limit These Foods     Do Not Eat These Foods    To keep calcium and phosphorus in balance, limit the amount of phosphorus you eat. To do so, eat less of the following foods:   Milk  Chocolate  Cheese  Beer  Yogurt  Firm tofu  Ice cream     Some foods are so high in phosphorus that you may need to stop eating them. Talk with your dietitian before eating these foods:   Cola drinks  Dried or baked beans  Nuts and seeds of all kinds  Peanut butter  Split peas  Whole-grain cereals    © 5744-5813 [place] [address]. All rights reserved. This information is not intended as a substitute for professional medical care. Always follow your healthcare professional's instructions.  Kidney Disease: Choosing the Right Protein for Your Body  The body uses certain types of proteins (complete proteins) more fully than others. When you make protein choices, keep the tips below in mind:  Choose Complete Proteins  Chicken, turkey, fish, and lean red meats are complete proteins. Eat most of your protein from these sources.  Dairy products, such as milk, cheese, yogurt, and eggs, are also complete proteins. But these foods can be high in fat, cholesterol, and phosphorus. Ask your dietitian or doctor how much of your protein intake should come from these foods.  Beans, corn, and  grains are incomplete proteins. Eat less of your protein from these sources.  Eat Your Daily Protein  The amount of protein that you can eat each day may change with time. Your health care provider determines your protein intake according to the stage of your kidney disease.  Your body weight is also a factor. If your protein intake is decreased, you may need to eat more calories from other types of food. Carbohydrates, such as bread and pasta, make good choices.  I can eat _____ grams of protein each day.  I should eat a total of _____ calories each day to maintain my body weight and muscle mass.  © 5466-6318 [facility], [address] [place] [address], [place] [address]. All rights reserved. This information is not intended as a substitute for professional medical care. Always follow your healthcare professional's instructions.  Kidney Disease: Eating a Safe Amount of Potassium  Potassium is a mineral found in many foods. The body needs some potassium to keep the heart working normally. But if your kidneys don’t work well, potassium can build up in your blood. In rare cases this can be deadly. By controlling the amount of potassium you eat, you can keep a safe level in your blood.  Using This Guide  Use this serving guide along with the food list below. Always follow your dietitian’s instructions on the number and size of servings to eat. Also, talk with your dietitian before eating foods that aren’t on this list.  ___ daily servings of foods that have high potassium content (250-500 mg per serving).  ___ daily servings of foods that have medium potassium content (150-250 mg per serving).  ___ daily servings of foods that have low potassium content (5-150 mg per serving).  You can substitute food choices in the following way:  Potassium Content of Some Foods      Vegetable  Fruit  Starches    High    Artichokes (1)   Bok wale (½ cup)  Spinach (½ cup)  Tomatoes (½ cup)  Bananas (1)   Cataloupe or honeydew  (½  melon)  Oranges (1)  Peaches, fresh (1)  Beans, dried (½ cup)   Lentils (½ cup)  Potatoes (½ cup  or 1 small)  Winter squash,  yams (½ cup)    Medium    Broccoli (½ cup)   Carrots (½ cup)  Eggplant (½ cup)  Peppers (1)  Apples (1)   Cherries (½ cup)  Peaches, canned (½ cup)  Pears, fresh (½ cup)  Bread, pumpernickel  (1 slice)   Chickenpeas,  cooked (½ cup)  Corn, fresh (½ cup)  Tortillas, corn (4 small)    Low    Asparagus (4 prieto)   Green beans (½ cup)  Cauliflower (½ cup)  Cucumbers (½)  Lettuce, iceberg (1 cup)  Blueberries (1 cup)   Grapefruit (½ cup)  Grapes (½ cup)  Strawberries (½ cup)  Watermelon (½ cup)  Bagel, plain (1)   Bread, white (2 slices)  Oatmeal (¾ cup)  Pasta, plain (1 cup)  Rice, white (1 cup)    © 8174-2356 [place] [address]. All rights reserved. This information is not intended as a substitute for professional medical care. Always follow your healthcare professional's instructions.  Kidney Disease: Eating Less Sodium  Sodium is a mineral that the body needs in small amounts. Because sodium is found in table salt, most people eat far more sodium than they need. When sodium intake is too high, it can increase thirst and cause the body to retain fluid. To avoid these side effects, people with kidney disease are often told to eat less sodium. The tips on this sheet can show you how.  When You Shop for Food  Unlike canned and processed foods, fresh foods have no added salt and are better for you. When you’re food shopping:  Choose fresh foods when you can.  Read food labels before buying packaged foods. Check the label’s nutrition facts for sodium amounts and servings per package.  Try to pick packaged foods with a sodium content of 140 mg (milligrams) or less per serving.  Do not choose foods with over 400 mg of sodium per serving.  Season Instead of Salt  Try the seasonings and foods listed below to season without sodium.  Basil: tomatoes, squash,  eggplant, soups, fish  Michaud: soups, rice, lentils, chicken  Dill: beets, cucumbers, green beans  Garlic: sauces, vegetables, meats, fish  Mercy: carrots, chicken, cooked fruit, white sauces  Lemon: asparagus, artichokes, broccoli, spinach, fish  Mint: cold soups, salads, fruit dishes  Oregano: eggplant, chicken, salads, sauces  Thyme: chicken, fish, lean meats, soups, stews  Do not use seasoning salt or salt substitutes. They may contain sodium or potassium (another mineral people with kidney disease are often told to limit).  © 1982-7480 [facility], [address] [place] [address], [place] [address]. All rights reserved. This information is not intended as a substitute for professional medical care. Always follow your healthcare professional's instructions.  Kidney Disease: Getting the Right Amount of Protein  Your body needs protein to build and repair muscles and bones. But as the body uses protein, a waste product (blood urea nitrogen or BUN) is produced. If your kidneys can’t filter wastes from your blood, the BUN level increases. If the level gets too high, you can become sick. Because of this, you need to control the amount of protein you eat each day. Use this handout to help you.      One portion (3 to 4 ounces) of fish, chicken, or red meat is about the size of a deck of playing cards.    Measuring Protein Content  You know how many grams of protein to eat, but most food portions are measured in ounces. Use the chart below to help determine the protein content of some common foods.  Chicken breast  3-4 ounces  21-28 grams    Chicken thigh  2-21/2 ounces  14-18 grams    Fish  3 ounces  21 grams    Pork chop  2-21/2 ounces  14-18 grams    Roast beef  3 ounces  21 grams    Steak  3-4 ounces  21-28 grams    Hamburger  3-4 ounces  21-28 grams    Eggs  1 egg  7 grams    Cheese  1 ounce  7 grams    If You Eat Too Much Protein   Eating too much protein may cause the following:     Nausea, vomiting  Fatigue  Mental  confusion  Increased potassium levels  Increased time on hemodialysis  If You Eat Too Little Protein   Eating too little protein may cause the following:  Muscle loss, weakness  Fatigue  Weight loss  Slower wound healing       © 8483-9670 [facility], [address] [place] [address]. All rights reserved. This information is not intended as a substitute for professional medical care. Always follow your healthcare professional's instructions.    Kidney Failure: Your Healthcare Team  The members of your healthcare team are the people who work with you to manage your treatment. They will guide you through your choices and teach you how to live with kidney failure. They can also address your concerns and give you support. Work with your team to ensure that you feel your best.  The Members of Your Team  These are some of the people who make up your healthcare team. You may already know a few of them.  A nephrologist is a doctor who treats kidney problems. He or she prescribes your treatment.  A registered nurse works with you and your doctors to manage your care and help you learn what you need to know about your treatment.  A dialysis technician assists with dialysis treatment.  A surgeon is a doctor who prepares your body for dialysis or who performs a kidney transplant.  A dietitian (also called a nutritionist) teaches you which foods and fluids to eat and drink and which to limit.  A  can help you deal with paperwork, insurance, your work and family life, and any stress you may feel.  © 7188-0514 [facility], [address] [place] [address]. All rights reserved. This information is not intended as a substitute for professional medical care. Always follow your healthcare professional's instructions.  Kidney Problems    The kidneys may fail due to problems with their blood vessels or filtering units. Such problems may be caused by an illness that affects the whole body. Diabetes  and high blood pressure are common examples. Filtering problems may also be caused by illnesses that harm the kidneys directly (glomerulonephritis and polycystic disease). In some cases, problems in the urinary tract may also cause kidney failure.  Problems with Blood Vessels  An illness can damage blood vessels inside the kidneys. As a result, the filtering units receive less blood, and pressure inside the kidneys cannot be controlled.  Problems with Filtering Units  Reduced blood supply or the wrong pressure can harm the filtering units. This makes them less able to remove wastes from the blood. As a result, the kidneys can’t maintain the proper balance of fluid and chemicals in the body. Waste products may be returned to the blood, or vital chemicals and proteins may be lost in the urine.  Problems in the Urinary Tract  A problem with the shape of the urinary tract may be present from birth. Other problems, such as blockages, may be due to aging or chronic infection. For instance, an enlarged prostate can cause urine to back up in the bladder or ureters. If waste can’t leave the body, your health is at risk.  © 5031-4598 [facility]arden[facility], [address] [place] [address], [place] [address]. All rights reserved. This information is not intended as a substitute for professional medical care. Always follow your healthcare professional's instructions.

## 2024-05-05 NOTE — DISCHARGE NOTE OB - MEDICATION SUMMARY - MEDICATIONS TO CHANGE
I will SWITCH the dose or number of times a day I take the medications listed below when I get home from the hospital:  None Principal Discharge DX:	Colitis   1

## 2024-10-21 NOTE — OB RN DELIVERY SUMMARY - NS_SCRUBTECH_OBGYN_ALL_OB_FT
Mikayla Garcia CST PAST SURGICAL HISTORY:  History of D&C     History of surgery Laparoscopic ovarian cyst removed    Termination of pregnancy (fetus) D &E- omphalocele

## 2025-04-30 ENCOUNTER — NON-APPOINTMENT (OUTPATIENT)
Age: 40
End: 2025-04-30

## 2025-04-30 PROBLEM — I10 ESSENTIAL (PRIMARY) HYPERTENSION: Chronic | Status: ACTIVE | Noted: 2022-12-06

## 2025-04-30 PROBLEM — J45.909 UNSPECIFIED ASTHMA, UNCOMPLICATED: Chronic | Status: ACTIVE | Noted: 2022-12-06

## 2025-05-01 ENCOUNTER — NON-APPOINTMENT (OUTPATIENT)
Age: 40
End: 2025-05-01

## 2025-05-05 ENCOUNTER — NON-APPOINTMENT (OUTPATIENT)
Age: 40
End: 2025-05-05

## 2025-05-06 ENCOUNTER — LABORATORY RESULT (OUTPATIENT)
Age: 40
End: 2025-05-06

## 2025-05-06 ENCOUNTER — OUTPATIENT (OUTPATIENT)
Dept: OUTPATIENT SERVICES | Facility: HOSPITAL | Age: 40
LOS: 1 days | End: 2025-05-06
Payer: MEDICAID

## 2025-05-06 ENCOUNTER — APPOINTMENT (OUTPATIENT)
Dept: MATERNAL FETAL MEDICINE | Facility: CLINIC | Age: 40
End: 2025-05-06
Payer: MEDICAID

## 2025-05-06 ENCOUNTER — APPOINTMENT (OUTPATIENT)
Dept: ANTEPARTUM | Facility: CLINIC | Age: 40
End: 2025-05-06
Payer: MEDICAID

## 2025-05-06 ENCOUNTER — ASOB RESULT (OUTPATIENT)
Age: 40
End: 2025-05-06

## 2025-05-06 ENCOUNTER — NON-APPOINTMENT (OUTPATIENT)
Age: 40
End: 2025-05-06

## 2025-05-06 VITALS
HEIGHT: 62 IN | SYSTOLIC BLOOD PRESSURE: 118 MMHG | OXYGEN SATURATION: 98 % | HEART RATE: 95 BPM | BODY MASS INDEX: 39.56 KG/M2 | WEIGHT: 215 LBS | DIASTOLIC BLOOD PRESSURE: 70 MMHG

## 2025-05-06 DIAGNOSIS — O09.899 SUPERVISION OF OTHER HIGH RISK PREGNANCIES, UNSPECIFIED TRIMESTER: ICD-10-CM

## 2025-05-06 DIAGNOSIS — Z98.891 HISTORY OF UTERINE SCAR FROM PREVIOUS SURGERY: Chronic | ICD-10-CM

## 2025-05-06 DIAGNOSIS — Z33.2 ENCOUNTER FOR ELECTIVE TERMINATION OF PREGNANCY: Chronic | ICD-10-CM

## 2025-05-06 DIAGNOSIS — O09.90 SUPERVISION OF HIGH RISK PREGNANCY, UNSPECIFIED, UNSPECIFIED TRIMESTER: ICD-10-CM

## 2025-05-06 LAB
BILIRUB UR QL STRIP: NORMAL
GLUCOSE UR-MCNC: NORMAL
HCG UR QL: 0.2 EU/DL
HGB BLD-MCNC: 9.8 G/DL — LOW (ref 11.5–15.5)
HGB UR QL STRIP.AUTO: NORMAL
KETONES UR-MCNC: NORMAL
LEUKOCYTE ESTERASE UR QL STRIP: NORMAL
MCV RBC AUTO: 65.2 FL — LOW (ref 80–100)
NITRITE UR QL STRIP: NORMAL
PH UR STRIP: 6
PROT UR STRIP-MCNC: NORMAL
RBC # BLD: 5.12 M/UL — SIGNIFICANT CHANGE UP (ref 3.8–5.2)
RBC # FLD: 19.5 % — HIGH (ref 10.3–14.5)
SP GR UR STRIP: 1.02

## 2025-05-06 PROCEDURE — 86901 BLOOD TYPING SEROLOGIC RH(D): CPT

## 2025-05-06 PROCEDURE — 76801 OB US < 14 WKS SINGLE FETUS: CPT

## 2025-05-06 PROCEDURE — 84550 ASSAY OF BLOOD/URIC ACID: CPT

## 2025-05-06 PROCEDURE — 86900 BLOOD TYPING SEROLOGIC ABO: CPT

## 2025-05-06 PROCEDURE — 84156 ASSAY OF PROTEIN URINE: CPT

## 2025-05-06 PROCEDURE — 80053 COMPREHEN METABOLIC PANEL: CPT

## 2025-05-06 PROCEDURE — 83020 HEMOGLOBIN ELECTROPHORESIS: CPT | Mod: 26

## 2025-05-06 PROCEDURE — 83020 HEMOGLOBIN ELECTROPHORESIS: CPT

## 2025-05-06 PROCEDURE — 81243 FMR1 GEN ALY DETC ABNL ALLEL: CPT

## 2025-05-06 PROCEDURE — 86850 RBC ANTIBODY SCREEN: CPT

## 2025-05-06 PROCEDURE — 81422 FETAL CHRMOML MICRODELTJ: CPT

## 2025-05-06 PROCEDURE — 81003 URINALYSIS AUTO W/O SCOPE: CPT

## 2025-05-06 PROCEDURE — 99245 OFF/OP CONSLTJ NEW/EST HI 55: CPT | Mod: GC,25

## 2025-05-06 PROCEDURE — 85027 COMPLETE CBC AUTOMATED: CPT

## 2025-05-06 PROCEDURE — 86780 TREPONEMA PALLIDUM: CPT

## 2025-05-06 PROCEDURE — 87086 URINE CULTURE/COLONY COUNT: CPT

## 2025-05-06 PROCEDURE — 81220 CFTR GENE COM VARIANTS: CPT

## 2025-05-06 PROCEDURE — 82570 ASSAY OF URINE CREATININE: CPT

## 2025-05-06 PROCEDURE — 86762 RUBELLA ANTIBODY: CPT

## 2025-05-06 PROCEDURE — 83615 LACTATE (LD) (LDH) ENZYME: CPT

## 2025-05-06 PROCEDURE — 87591 N.GONORRHOEAE DNA AMP PROB: CPT

## 2025-05-06 PROCEDURE — 86765 RUBEOLA ANTIBODY: CPT

## 2025-05-06 PROCEDURE — 36415 COLL VENOUS BLD VENIPUNCTURE: CPT

## 2025-05-06 PROCEDURE — 83655 ASSAY OF LEAD: CPT

## 2025-05-06 PROCEDURE — 81420 FETAL CHRMOML ANEUPLOIDY: CPT

## 2025-05-06 PROCEDURE — 84443 ASSAY THYROID STIM HORMONE: CPT

## 2025-05-06 PROCEDURE — 86480 TB TEST CELL IMMUN MEASURE: CPT

## 2025-05-06 PROCEDURE — G0463: CPT

## 2025-05-06 PROCEDURE — 86787 VARICELLA-ZOSTER ANTIBODY: CPT

## 2025-05-06 PROCEDURE — 76813 OB US NUCHAL MEAS 1 GEST: CPT | Mod: 26

## 2025-05-06 PROCEDURE — 86803 HEPATITIS C AB TEST: CPT

## 2025-05-06 PROCEDURE — 87491 CHLMYD TRACH DNA AMP PROBE: CPT

## 2025-05-06 PROCEDURE — 87389 HIV-1 AG W/HIV-1&-2 AB AG IA: CPT

## 2025-05-06 PROCEDURE — 87340 HEPATITIS B SURFACE AG IA: CPT

## 2025-05-06 PROCEDURE — 81329 SMN1 GENE DOS/DELETION ALYS: CPT

## 2025-05-06 PROCEDURE — 76801 OB US < 14 WKS SINGLE FETUS: CPT | Mod: 26,59

## 2025-05-06 PROCEDURE — 86706 HEP B SURFACE ANTIBODY: CPT

## 2025-05-06 PROCEDURE — 76813 OB US NUCHAL MEAS 1 GEST: CPT

## 2025-05-06 RX ORDER — PSEUDOEPHEDRINE HCL 30 MG
27-0.8 TABLET ORAL DAILY
Qty: 30 | Refills: 0 | Status: ACTIVE | COMMUNITY
Start: 2025-05-06 | End: 1900-01-01

## 2025-05-06 RX ORDER — ASPIRIN 81 MG/1
81 TABLET, COATED ORAL DAILY
Qty: 60 | Refills: 3 | Status: ACTIVE | COMMUNITY
Start: 2025-05-06 | End: 1900-01-01

## 2025-05-07 ENCOUNTER — NON-APPOINTMENT (OUTPATIENT)
Age: 40
End: 2025-05-07

## 2025-05-07 ENCOUNTER — LABORATORY RESULT (OUTPATIENT)
Age: 40
End: 2025-05-07

## 2025-05-07 DIAGNOSIS — D64.9 ANEMIA, UNSPECIFIED: ICD-10-CM

## 2025-05-07 LAB
ALBUMIN SERPL ELPH-MCNC: 4 G/DL — SIGNIFICANT CHANGE UP (ref 3.3–5)
ALP SERPL-CCNC: 53 U/L — SIGNIFICANT CHANGE UP (ref 40–120)
ALT FLD-CCNC: 10 U/L — SIGNIFICANT CHANGE UP (ref 10–40)
ANION GAP SERPL CALC-SCNC: 13 MMOL/L — SIGNIFICANT CHANGE UP (ref 5–17)
AST SERPL-CCNC: 17 U/L — SIGNIFICANT CHANGE UP (ref 10–35)
BILIRUB SERPL-MCNC: 0.2 MG/DL — SIGNIFICANT CHANGE UP (ref 0.2–1.2)
BLD GP AB SCN SERPL QL: SIGNIFICANT CHANGE UP
BUN SERPL-MCNC: 9 MG/DL — SIGNIFICANT CHANGE UP (ref 7–23)
C TRACH RRNA SPEC QL NAA+PROBE: SIGNIFICANT CHANGE UP
CALCIUM SERPL-MCNC: 9 MG/DL — SIGNIFICANT CHANGE UP (ref 8.4–10.5)
CHLORIDE SERPL-SCNC: 100 MMOL/L — SIGNIFICANT CHANGE UP (ref 96–108)
CO2 SERPL-SCNC: 21 MMOL/L — LOW (ref 22–31)
CREAT ?TM UR-MCNC: 176 MG/DL — SIGNIFICANT CHANGE UP
CREAT SERPL-MCNC: 0.55 MG/DL — SIGNIFICANT CHANGE UP (ref 0.5–1.3)
EGFR: 120 ML/MIN/1.73M2 — SIGNIFICANT CHANGE UP
EGFR: 120 ML/MIN/1.73M2 — SIGNIFICANT CHANGE UP
GLUCOSE SERPL-MCNC: 82 MG/DL — SIGNIFICANT CHANGE UP (ref 70–99)
HBV SURFACE AG SER-ACNC: SIGNIFICANT CHANGE UP
HCT VFR BLD CALC: 34.3 % — LOW (ref 34.5–45)
HCV AB S/CO SERPL IA: 0.23 S/CO — SIGNIFICANT CHANGE UP (ref 0–0.79)
HCV AB SERPL-IMP: SIGNIFICANT CHANGE UP
HEMOGLOBIN INTERPRETATION: SIGNIFICANT CHANGE UP
HGB A MFR BLD: 98.1 % — HIGH (ref 95.8–98)
HGB A2 MFR BLD: 1.9 % — LOW (ref 2–3.2)
HIV 1+2 AB+HIV1 P24 AG SERPL QL IA: SIGNIFICANT CHANGE UP
LDH SERPL L TO P-CCNC: 182 U/L — SIGNIFICANT CHANGE UP (ref 50–242)
LEAD BLD-MCNC: <1 UG/DL — SIGNIFICANT CHANGE UP (ref 0–3.4)
MCHC RBC-ENTMCNC: 18.8 PG — LOW (ref 27–34)
MCHC RBC-ENTMCNC: 28.6 G/DL — LOW (ref 32–36)
MEV IGG SER-ACNC: 288 AU/ML — SIGNIFICANT CHANGE UP
MEV IGG+IGM SER-IMP: POSITIVE — SIGNIFICANT CHANGE UP
N GONORRHOEA RRNA SPEC QL NAA+PROBE: SIGNIFICANT CHANGE UP
PLATELET # BLD AUTO: 321 K/UL — SIGNIFICANT CHANGE UP (ref 150–400)
POTASSIUM SERPL-MCNC: 4.4 MMOL/L — SIGNIFICANT CHANGE UP (ref 3.5–5.3)
POTASSIUM SERPL-SCNC: 4.4 MMOL/L — SIGNIFICANT CHANGE UP (ref 3.5–5.3)
PROT ?TM UR-MCNC: 19 MG/DL — HIGH (ref 0–12)
PROT SERPL-MCNC: 7.6 G/DL — SIGNIFICANT CHANGE UP (ref 6–8.3)
PROT/CREAT UR-RTO: 0.1 RATIO — SIGNIFICANT CHANGE UP (ref 0–0.2)
RUBV IGG SER-ACNC: 4.67 INDEX — SIGNIFICANT CHANGE UP
RUBV IGG SER-IMP: POSITIVE — SIGNIFICANT CHANGE UP
SODIUM SERPL-SCNC: 135 MMOL/L — SIGNIFICANT CHANGE UP (ref 135–145)
SPECIMEN SOURCE: SIGNIFICANT CHANGE UP
T PALLIDUM AB TITR SER: NEGATIVE — SIGNIFICANT CHANGE UP
TSH SERPL-MCNC: 2.26 UIU/ML — SIGNIFICANT CHANGE UP (ref 0.27–4.2)
URATE SERPL-MCNC: 2.9 MG/DL — SIGNIFICANT CHANGE UP (ref 2.5–7)
VZV IGG FLD QL IA: 6.08 S/CO — SIGNIFICANT CHANGE UP
VZV IGG FLD QL IA: POSITIVE — SIGNIFICANT CHANGE UP
WBC # BLD: 5.69 K/UL — SIGNIFICANT CHANGE UP (ref 3.8–10.5)
WBC # FLD AUTO: 5.69 K/UL — SIGNIFICANT CHANGE UP (ref 3.8–10.5)

## 2025-05-07 RX ORDER — ASCORBIC ACID 500 MG
500 TABLET ORAL
Qty: 60 | Refills: 2 | Status: ACTIVE | COMMUNITY
Start: 2025-05-07 | End: 1900-01-01

## 2025-05-07 RX ORDER — DOCUSATE SODIUM 100 MG/1
100 CAPSULE ORAL
Qty: 60 | Refills: 1 | Status: ACTIVE | COMMUNITY
Start: 2025-05-07 | End: 1900-01-01

## 2025-05-08 ENCOUNTER — TRANSCRIPTION ENCOUNTER (OUTPATIENT)
Age: 40
End: 2025-05-08

## 2025-05-08 LAB
CULTURE RESULTS: SIGNIFICANT CHANGE UP
GAMMA INTERFERON BACKGROUND BLD IA-ACNC: 0.12 IU/ML — SIGNIFICANT CHANGE UP
HBV SURFACE AB SER-ACNC: REACTIVE — SIGNIFICANT CHANGE UP
M TB IFN-G BLD-IMP: NEGATIVE — SIGNIFICANT CHANGE UP
M TB IFN-G CD4+ BCKGRND COR BLD-ACNC: 0.05 IU/ML — SIGNIFICANT CHANGE UP
M TB IFN-G CD4+CD8+ BCKGRND COR BLD-ACNC: 0 IU/ML — SIGNIFICANT CHANGE UP
QUANT TB PLUS MITOGEN MINUS NIL: >10 IU/ML — SIGNIFICANT CHANGE UP
SPECIMEN SOURCE: SIGNIFICANT CHANGE UP

## 2025-05-11 LAB
11Q23 DELETION (JACOBSEN): SIGNIFICANT CHANGE UP
15Q11 DELETION (PW ANGELMAN): SIGNIFICANT CHANGE UP
1P36 DELETION SYNDROME: SIGNIFICANT CHANGE UP
22Q11 DELETION (DIGEORGE): SIGNIFICANT CHANGE UP
4P16 DELETION(WOLF-HIRSCHHORN): SIGNIFICANT CHANGE UP
5P15 DELETION (CRI-DU-CHAT): SIGNIFICANT CHANGE UP
8Q24 DELETION (LANGER-GIEDION): SIGNIFICANT CHANGE UP
ABOUT THE TEST: SIGNIFICANT CHANGE UP
APPROVED BY: SIGNIFICANT CHANGE UP
FETAL SEX: SIGNIFICANT CHANGE UP
LAB DIRECTOR COMMENTS: SIGNIFICANT CHANGE UP
Lab: NEGATIVE — SIGNIFICANT CHANGE UP
Lab: SIGNIFICANT CHANGE UP
Lab: YES — SIGNIFICANT CHANGE UP
MONOSOMY X (TURNER SYNDROME): SIGNIFICANT CHANGE UP
REFERENCES: SIGNIFICANT CHANGE UP
SMA INTERPRETATION: SIGNIFICANT CHANGE UP
TRISOMY 13 (PATAU SYNDROME): NEGATIVE — SIGNIFICANT CHANGE UP
TRISOMY 16: SIGNIFICANT CHANGE UP
TRISOMY 18 (EDWARDS SYNDROME): NEGATIVE — SIGNIFICANT CHANGE UP
TRISOMY 21 (DOWN SYNDROME): NEGATIVE — SIGNIFICANT CHANGE UP
TRISOMY 22: SIGNIFICANT CHANGE UP
XXX (TRIPLE X SYNDROME): SIGNIFICANT CHANGE UP
XXY (KLINEFELTER SYNDROME): SIGNIFICANT CHANGE UP
XYY (JACOBS SYNDROME): SIGNIFICANT CHANGE UP

## 2025-05-13 LAB — FRAGILE X PROTEIN (FMRP) PNL BLD: SIGNIFICANT CHANGE UP

## 2025-05-14 DIAGNOSIS — O10.919 UNSPECIFIED PRE-EXISTING HYPERTENSION COMPLICATING PREGNANCY, UNSPECIFIED TRIMESTER: ICD-10-CM

## 2025-05-14 DIAGNOSIS — O09.521 SUPERVISION OF ELDERLY MULTIGRAVIDA, FIRST TRIMESTER: ICD-10-CM

## 2025-05-14 DIAGNOSIS — O34.219 MATERNAL CARE FOR UNSPECIFIED TYPE SCAR FROM PREVIOUS CESAREAN DELIVERY: ICD-10-CM

## 2025-05-14 DIAGNOSIS — O09.90 SUPERVISION OF HIGH RISK PREGNANCY, UNSPECIFIED, UNSPECIFIED TRIMESTER: ICD-10-CM

## 2025-05-14 LAB — CYSTIC FIBROSIS EXPANDED PANEL: SIGNIFICANT CHANGE UP

## 2025-05-20 ENCOUNTER — NON-APPOINTMENT (OUTPATIENT)
Age: 40
End: 2025-05-20

## 2025-06-03 ENCOUNTER — APPOINTMENT (OUTPATIENT)
Dept: MATERNAL FETAL MEDICINE | Facility: CLINIC | Age: 40
End: 2025-06-03

## 2025-06-03 ENCOUNTER — LABORATORY RESULT (OUTPATIENT)
Age: 40
End: 2025-06-03

## 2025-06-03 ENCOUNTER — NON-APPOINTMENT (OUTPATIENT)
Age: 40
End: 2025-06-03

## 2025-06-03 ENCOUNTER — OUTPATIENT (OUTPATIENT)
Dept: OUTPATIENT SERVICES | Facility: HOSPITAL | Age: 40
LOS: 1 days | End: 2025-06-03
Payer: MEDICAID

## 2025-06-03 ENCOUNTER — APPOINTMENT (OUTPATIENT)
Dept: ANTEPARTUM | Facility: CLINIC | Age: 40
End: 2025-06-03
Payer: MEDICAID

## 2025-06-03 ENCOUNTER — ASOB RESULT (OUTPATIENT)
Age: 40
End: 2025-06-03

## 2025-06-03 VITALS
HEART RATE: 91 BPM | WEIGHT: 180.13 LBS | SYSTOLIC BLOOD PRESSURE: 160 MMHG | OXYGEN SATURATION: 98 % | DIASTOLIC BLOOD PRESSURE: 100 MMHG | BODY MASS INDEX: 32.95 KG/M2

## 2025-06-03 VITALS — DIASTOLIC BLOOD PRESSURE: 81 MMHG | HEART RATE: 84 BPM | SYSTOLIC BLOOD PRESSURE: 129 MMHG

## 2025-06-03 DIAGNOSIS — O99.519 DISEASES OF THE RESPIRATORY SYSTEM COMPLICATING PREGNANCY, UNSPECIFIED TRIMESTER: ICD-10-CM

## 2025-06-03 DIAGNOSIS — O09.521 SUPERVISION OF ELDERLY MULTIGRAVIDA, FIRST TRIMESTER: ICD-10-CM

## 2025-06-03 DIAGNOSIS — Z33.2 ENCOUNTER FOR ELECTIVE TERMINATION OF PREGNANCY: Chronic | ICD-10-CM

## 2025-06-03 DIAGNOSIS — O09.899 SUPERVISION OF OTHER HIGH RISK PREGNANCIES, UNSPECIFIED TRIMESTER: ICD-10-CM

## 2025-06-03 DIAGNOSIS — O09.299 SUPERVISION OF PREGNANCY WITH OTHER POOR REPRODUCTIVE OR OBSTETRIC HISTORY, UNSPECIFIED TRIMESTER: ICD-10-CM

## 2025-06-03 DIAGNOSIS — O22.00 VARICOSE VEINS OF LOWER EXTREMITY IN PREGNANCY, UNSPECIFIED TRIMESTER: ICD-10-CM

## 2025-06-03 DIAGNOSIS — Z98.891 HISTORY OF UTERINE SCAR FROM PREVIOUS SURGERY: Chronic | ICD-10-CM

## 2025-06-03 DIAGNOSIS — J45.909 DISEASES OF THE RESPIRATORY SYSTEM COMPLICATING PREGNANCY, UNSPECIFIED TRIMESTER: ICD-10-CM

## 2025-06-03 DIAGNOSIS — O99.019 ANEMIA COMPLICATING PREGNANCY, UNSPECIFIED TRIMESTER: ICD-10-CM

## 2025-06-03 DIAGNOSIS — O09.90 SUPERVISION OF HIGH RISK PREGNANCY, UNSPECIFIED, UNSPECIFIED TRIMESTER: ICD-10-CM

## 2025-06-03 DIAGNOSIS — O34.219 MATERNAL CARE FOR UNSPECIFIED TYPE SCAR FROM PREVIOUS CESAREAN DELIVERY: ICD-10-CM

## 2025-06-03 DIAGNOSIS — O10.919 UNSPECIFIED PRE-EXISTING HYPERTENSION COMPLICATING PREGNANCY, UNSPECIFIED TRIMESTER: ICD-10-CM

## 2025-06-03 DIAGNOSIS — O09.529 SUPERVISION OF ELDERLY MULTIGRAVIDA, UNSPECIFIED TRIMESTER: ICD-10-CM

## 2025-06-03 LAB
BILIRUB UR QL STRIP: NEGATIVE
FERRITIN SERPL-MCNC: 15 NG/ML — SIGNIFICANT CHANGE UP (ref 15–150)
FOLATE SERPL-MCNC: 13.5 NG/ML — SIGNIFICANT CHANGE UP
GLUCOSE UR-MCNC: NEGATIVE
HCG UR QL: 1 EU/DL
HGB UR QL STRIP.AUTO: NORMAL
IRON SATN MFR SERPL: 19 UG/DL — LOW (ref 30–160)
IRON SATN MFR SERPL: 4 % — LOW (ref 14–50)
KETONES UR-MCNC: NEGATIVE
LEUKOCYTE ESTERASE UR QL STRIP: NEGATIVE
NITRITE UR QL STRIP: NEGATIVE
PH UR STRIP: 7
PROT UR STRIP-MCNC: NEGATIVE
SP GR UR STRIP: 1.02
TIBC SERPL-MCNC: 515 UG/DL — HIGH (ref 220–430)
UIBC SERPL-MCNC: 496 UG/DL — HIGH (ref 110–370)
VIT B12 SERPL-MCNC: 370 PG/ML — SIGNIFICANT CHANGE UP (ref 232–1245)

## 2025-06-03 PROCEDURE — 76805 OB US >/= 14 WKS SNGL FETUS: CPT

## 2025-06-03 PROCEDURE — 82105 ALPHA-FETOPROTEIN SERUM: CPT

## 2025-06-03 PROCEDURE — 99213 OFFICE O/P EST LOW 20 MIN: CPT | Mod: GC,25

## 2025-06-03 PROCEDURE — 76805 OB US >/= 14 WKS SNGL FETUS: CPT | Mod: 26

## 2025-06-03 PROCEDURE — 87086 URINE CULTURE/COLONY COUNT: CPT

## 2025-06-03 PROCEDURE — 82607 VITAMIN B-12: CPT

## 2025-06-03 PROCEDURE — 81003 URINALYSIS AUTO W/O SCOPE: CPT

## 2025-06-03 PROCEDURE — G0463: CPT

## 2025-06-03 PROCEDURE — 82746 ASSAY OF FOLIC ACID SERUM: CPT

## 2025-06-03 PROCEDURE — 82728 ASSAY OF FERRITIN: CPT

## 2025-06-03 PROCEDURE — 83550 IRON BINDING TEST: CPT

## 2025-06-03 PROCEDURE — 83540 ASSAY OF IRON: CPT

## 2025-06-03 PROCEDURE — 36415 COLL VENOUS BLD VENIPUNCTURE: CPT

## 2025-06-03 RX ORDER — BLOOD PRESSURE TEST KIT-LARGE
KIT MISCELLANEOUS
Qty: 1 | Refills: 0 | Status: ACTIVE | COMMUNITY
Start: 2025-06-03 | End: 1900-01-01

## 2025-06-03 RX ORDER — ASPIRIN 81 MG/1
81 TABLET, CHEWABLE ORAL
Qty: 90 | Refills: 4 | Status: ACTIVE | COMMUNITY
Start: 2025-06-03 | End: 1900-01-01

## 2025-06-03 RX ORDER — COMPRESSION  PANTYHOSE, SMALL
EACH MISCELLANEOUS
Qty: 2 | Refills: 0 | Status: ACTIVE | COMMUNITY
Start: 2025-06-03 | End: 1900-01-01

## 2025-06-04 ENCOUNTER — LABORATORY RESULT (OUTPATIENT)
Age: 40
End: 2025-06-04

## 2025-06-04 LAB
AF-AFP MOM: 1.23 — SIGNIFICANT CHANGE UP
AFP CONCENTRATION: 35.17 NG/ML — SIGNIFICANT CHANGE UP
AFP INTERPRETATION: SIGNIFICANT CHANGE UP
AFP MOM CUT-OFF: 2.5 — SIGNIFICANT CHANGE UP
AFP PERCENTILE: 73.8 — SIGNIFICANT CHANGE UP
AFP SCREENING RESULT: SIGNIFICANT CHANGE UP
AFTER SCREENING RISK OPEN SPINA BIFIDA: SIGNIFICANT CHANGE UP
BEFORE SCREENING RISK OPEN SPINA BIFIDA: SIGNIFICANT CHANGE UP
EXTREME ANALYTE ALERT: NO — SIGNIFICANT CHANGE UP
GEN TEST INFORMATION: SIGNIFICANT CHANGE UP
MS GESTATIONAL AGE: SIGNIFICANT CHANGE UP
ORDER ENTRY INFORMATION: SIGNIFICANT CHANGE UP

## 2025-06-05 LAB
CULTURE RESULTS: SIGNIFICANT CHANGE UP
SPECIMEN SOURCE: SIGNIFICANT CHANGE UP

## 2025-06-09 DIAGNOSIS — O09.212 SUPERVISION OF PREGNANCY WITH HISTORY OF PRE-TERM LABOR, SECOND TRIMESTER: ICD-10-CM

## 2025-06-09 DIAGNOSIS — O34.211 MATERNAL CARE FOR LOW TRANSVERSE SCAR FROM PREVIOUS CESAREAN DELIVERY: ICD-10-CM

## 2025-06-09 DIAGNOSIS — Z3A.15 15 WEEKS GESTATION OF PREGNANCY: ICD-10-CM

## 2025-06-09 DIAGNOSIS — O09.299 SUPERVISION OF PREGNANCY WITH OTHER POOR REPRODUCTIVE OR OBSTETRIC HISTORY, UNSPECIFIED TRIMESTER: ICD-10-CM

## 2025-06-11 NOTE — OB RN PATIENT PROFILE - TERM DELIVERIES, OB PROFILE
Medication failed protocol.  Medication: Fezolinetant 45 MG Tab   Medication Refill Protocol Failed.  Failed criteria: requires PA. Sent to clinician to review.   Pharmacy: [facility] [place] [address] [facility] [address]   Last office visit date: 4/23/25  Next appointment scheduled?: No; Outreach performed, left message for patient to call back.       2

## 2025-06-17 ENCOUNTER — APPOINTMENT (OUTPATIENT)
Dept: MATERNAL FETAL MEDICINE | Facility: CLINIC | Age: 40
End: 2025-06-17

## 2025-06-17 ENCOUNTER — OUTPATIENT (OUTPATIENT)
Dept: OUTPATIENT SERVICES | Facility: HOSPITAL | Age: 40
LOS: 1 days | End: 2025-06-17

## 2025-06-17 VITALS
DIASTOLIC BLOOD PRESSURE: 80 MMHG | BODY MASS INDEX: 34.69 KG/M2 | WEIGHT: 188.5 LBS | SYSTOLIC BLOOD PRESSURE: 136 MMHG | HEART RATE: 94 BPM | OXYGEN SATURATION: 98 % | HEIGHT: 62 IN

## 2025-06-17 DIAGNOSIS — Z98.891 HISTORY OF UTERINE SCAR FROM PREVIOUS SURGERY: Chronic | ICD-10-CM

## 2025-06-17 DIAGNOSIS — O09.899 SUPERVISION OF OTHER HIGH RISK PREGNANCIES, UNSPECIFIED TRIMESTER: ICD-10-CM

## 2025-06-17 DIAGNOSIS — Z33.2 ENCOUNTER FOR ELECTIVE TERMINATION OF PREGNANCY: Chronic | ICD-10-CM

## 2025-06-17 PROBLEM — O26.899 LOW BACK PAIN DURING PREGNANCY: Status: ACTIVE | Noted: 2025-06-17

## 2025-06-17 LAB
BILIRUB UR QL STRIP: NEGATIVE
GLUCOSE UR-MCNC: NEGATIVE
HCG UR QL: 0.2 EU/DL
HGB UR QL STRIP.AUTO: NORMAL
KETONES UR-MCNC: NEGATIVE
LEUKOCYTE ESTERASE UR QL STRIP: NEGATIVE
NITRITE UR QL STRIP: NEGATIVE
PH UR STRIP: 6
PROT UR STRIP-MCNC: NORMAL
SP GR UR STRIP: 1.02

## 2025-06-30 ENCOUNTER — NON-APPOINTMENT (OUTPATIENT)
Age: 40
End: 2025-06-30

## 2025-07-01 ENCOUNTER — LABORATORY RESULT (OUTPATIENT)
Age: 40
End: 2025-07-01

## 2025-07-01 ENCOUNTER — OUTPATIENT (OUTPATIENT)
Dept: OUTPATIENT SERVICES | Facility: HOSPITAL | Age: 40
LOS: 1 days | End: 2025-07-01
Payer: MEDICAID

## 2025-07-01 ENCOUNTER — APPOINTMENT (OUTPATIENT)
Dept: ANTEPARTUM | Facility: CLINIC | Age: 40
End: 2025-07-01
Payer: MEDICAID

## 2025-07-01 ENCOUNTER — ASOB RESULT (OUTPATIENT)
Age: 40
End: 2025-07-01

## 2025-07-01 ENCOUNTER — APPOINTMENT (OUTPATIENT)
Dept: MATERNAL FETAL MEDICINE | Facility: CLINIC | Age: 40
End: 2025-07-01

## 2025-07-01 VITALS
HEART RATE: 95 BPM | OXYGEN SATURATION: 98 % | DIASTOLIC BLOOD PRESSURE: 80 MMHG | BODY MASS INDEX: 34.77 KG/M2 | WEIGHT: 190.13 LBS | SYSTOLIC BLOOD PRESSURE: 140 MMHG

## 2025-07-01 VITALS — SYSTOLIC BLOOD PRESSURE: 150 MMHG | DIASTOLIC BLOOD PRESSURE: 70 MMHG

## 2025-07-01 DIAGNOSIS — O09.899 SUPERVISION OF OTHER HIGH RISK PREGNANCIES, UNSPECIFIED TRIMESTER: ICD-10-CM

## 2025-07-01 DIAGNOSIS — Z33.2 ENCOUNTER FOR ELECTIVE TERMINATION OF PREGNANCY: Chronic | ICD-10-CM

## 2025-07-01 DIAGNOSIS — Z98.891 HISTORY OF UTERINE SCAR FROM PREVIOUS SURGERY: Chronic | ICD-10-CM

## 2025-07-01 LAB
ALBUMIN SERPL ELPH-MCNC: 3.6 G/DL — SIGNIFICANT CHANGE UP (ref 3.3–5)
ALP SERPL-CCNC: 56 U/L — SIGNIFICANT CHANGE UP (ref 40–120)
ALT FLD-CCNC: 13 U/L — SIGNIFICANT CHANGE UP (ref 10–40)
ANION GAP SERPL CALC-SCNC: 12 MMOL/L — SIGNIFICANT CHANGE UP (ref 5–17)
ANISOCYTOSIS BLD QL: SLIGHT — SIGNIFICANT CHANGE UP
AST SERPL-CCNC: 15 U/L — SIGNIFICANT CHANGE UP (ref 10–35)
BASOPHILS # BLD AUTO: 0.02 K/UL — SIGNIFICANT CHANGE UP (ref 0–0.2)
BASOPHILS NFR BLD AUTO: 0.3 % — SIGNIFICANT CHANGE UP (ref 0–2)
BILIRUB SERPL-MCNC: 0.2 MG/DL — SIGNIFICANT CHANGE UP (ref 0.2–1.2)
BILIRUB UR QL STRIP: NORMAL
BUN SERPL-MCNC: 8 MG/DL — SIGNIFICANT CHANGE UP (ref 7–23)
CALCIUM SERPL-MCNC: 9.2 MG/DL — SIGNIFICANT CHANGE UP (ref 8.4–10.5)
CHLORIDE SERPL-SCNC: 101 MMOL/L — SIGNIFICANT CHANGE UP (ref 96–108)
CO2 SERPL-SCNC: 22 MMOL/L — SIGNIFICANT CHANGE UP (ref 22–31)
CREAT SERPL-MCNC: 0.48 MG/DL — LOW (ref 0.5–1.3)
EGFR: 123 ML/MIN/1.73M2 — SIGNIFICANT CHANGE UP
EGFR: 123 ML/MIN/1.73M2 — SIGNIFICANT CHANGE UP
EOSINOPHIL # BLD AUTO: 0.18 K/UL — SIGNIFICANT CHANGE UP (ref 0–0.5)
EOSINOPHIL NFR BLD AUTO: 2.9 % — SIGNIFICANT CHANGE UP (ref 0–6)
GLUCOSE SERPL-MCNC: 102 MG/DL — HIGH (ref 70–99)
GLUCOSE UR-MCNC: NORMAL
HCG UR QL: 0.2 EU/DL
HCT VFR BLD CALC: 33.4 % — LOW (ref 34.5–45)
HGB BLD-MCNC: 9.6 G/DL — LOW (ref 11.5–15.5)
HGB UR QL STRIP.AUTO: NORMAL
HYPOCHROMIA BLD QL: SLIGHT — SIGNIFICANT CHANGE UP
IMM GRANULOCYTES NFR BLD AUTO: 0.5 % — SIGNIFICANT CHANGE UP (ref 0–0.9)
KETONES UR-MCNC: NORMAL
LEUKOCYTE ESTERASE UR QL STRIP: NORMAL
LYMPHOCYTES # BLD AUTO: 1.86 K/UL — SIGNIFICANT CHANGE UP (ref 1–3.3)
LYMPHOCYTES # BLD AUTO: 29.8 % — SIGNIFICANT CHANGE UP (ref 13–44)
MANUAL SMEAR VERIFICATION: SIGNIFICANT CHANGE UP
MCHC RBC-ENTMCNC: 19.3 PG — LOW (ref 27–34)
MCHC RBC-ENTMCNC: 28.7 G/DL — LOW (ref 32–36)
MCV RBC AUTO: 67.2 FL — LOW (ref 80–100)
MICROCYTES BLD QL: SLIGHT — SIGNIFICANT CHANGE UP
MONOCYTES # BLD AUTO: 0.42 K/UL — SIGNIFICANT CHANGE UP (ref 0–0.9)
MONOCYTES NFR BLD AUTO: 6.7 % — SIGNIFICANT CHANGE UP (ref 2–14)
NEUTROPHILS # BLD AUTO: 3.73 K/UL — SIGNIFICANT CHANGE UP (ref 1.8–7.4)
NEUTROPHILS NFR BLD AUTO: 59.8 % — SIGNIFICANT CHANGE UP (ref 43–77)
NITRITE UR QL STRIP: NORMAL
PH UR STRIP: 6.5
PLAT MORPH BLD: NORMAL — SIGNIFICANT CHANGE UP
PLATELET # BLD AUTO: 258 K/UL — SIGNIFICANT CHANGE UP (ref 150–400)
POLYCHROMASIA BLD QL SMEAR: SLIGHT — SIGNIFICANT CHANGE UP
POTASSIUM SERPL-MCNC: 3.9 MMOL/L — SIGNIFICANT CHANGE UP (ref 3.5–5.3)
POTASSIUM SERPL-SCNC: 3.9 MMOL/L — SIGNIFICANT CHANGE UP (ref 3.5–5.3)
PROT SERPL-MCNC: 7 G/DL — SIGNIFICANT CHANGE UP (ref 6–8.3)
PROT UR STRIP-MCNC: NORMAL
RBC # BLD: 4.97 M/UL — SIGNIFICANT CHANGE UP (ref 3.8–5.2)
RBC # FLD: 19.6 % — HIGH (ref 10.3–14.5)
RBC BLD AUTO: ABNORMAL
SODIUM SERPL-SCNC: 135 MMOL/L — SIGNIFICANT CHANGE UP (ref 135–145)
SP GR UR STRIP: 1.01
WBC # BLD: 6.24 K/UL — SIGNIFICANT CHANGE UP (ref 3.8–10.5)
WBC # FLD AUTO: 6.24 K/UL — SIGNIFICANT CHANGE UP (ref 3.8–10.5)

## 2025-07-01 PROCEDURE — 81003 URINALYSIS AUTO W/O SCOPE: CPT

## 2025-07-01 PROCEDURE — 82575 CREATININE CLEARANCE TEST: CPT

## 2025-07-01 PROCEDURE — 76811 OB US DETAILED SNGL FETUS: CPT | Mod: 26

## 2025-07-01 PROCEDURE — 99213 OFFICE O/P EST LOW 20 MIN: CPT | Mod: GC,25

## 2025-07-01 PROCEDURE — 80053 COMPREHEN METABOLIC PANEL: CPT

## 2025-07-01 PROCEDURE — 85025 COMPLETE CBC W/AUTO DIFF WBC: CPT

## 2025-07-01 PROCEDURE — 84156 ASSAY OF PROTEIN URINE: CPT

## 2025-07-01 PROCEDURE — G0463: CPT

## 2025-07-01 PROCEDURE — 76811 OB US DETAILED SNGL FETUS: CPT

## 2025-07-01 RX ORDER — ALBUTEROL SULFATE 90 UG/1
108 (90 BASE) INHALANT RESPIRATORY (INHALATION)
Qty: 1 | Refills: 0 | Status: ACTIVE | COMMUNITY
Start: 2025-07-01 | End: 1900-01-01

## 2025-07-01 RX ORDER — NIFEDIPINE 30 MG/1
30 TABLET, EXTENDED RELEASE ORAL
Qty: 30 | Refills: 2 | Status: ACTIVE | COMMUNITY
Start: 2025-07-01 | End: 1900-01-01

## 2025-07-02 LAB
BODY SURFACE AREA CALCULATION: 1.73 M2 — SIGNIFICANT CHANGE UP
COLLECT DURATION TIME UR: 24 HR — SIGNIFICANT CHANGE UP
COLLECT DURATION TIME UR: 24 HR — SIGNIFICANT CHANGE UP
CREAT 24H UR-MCNC: 55 MG/DL — SIGNIFICANT CHANGE UP
CREAT 24H UR-MCNC: 55 MG/DL — SIGNIFICANT CHANGE UP
CREAT CL ?TM UR+SERPL-VRATE: 239 ML/MIN — HIGH (ref 75–115)
CREAT UR-MCNC: 55 MG/DL — SIGNIFICANT CHANGE UP
CREAT UR-MCNC: 55 MG/DL — SIGNIFICANT CHANGE UP
Lab: 12 MG/DL — SIGNIFICANT CHANGE UP
Lab: 218 MG/G — HIGH
PROT 24H UR-MRATE: 360 MG/24HR — HIGH
TOTAL VOLUME - URINE: 3000 ML — SIGNIFICANT CHANGE UP
TOTAL VOLUME - URINE: 3000 ML — SIGNIFICANT CHANGE UP
URINE CREATININE CALCULATION: 1.6 G/24 HR — SIGNIFICANT CHANGE UP (ref 0.8–1.6)
URINE CREATININE CALCULATION: 1.6 G/24 HR — SIGNIFICANT CHANGE UP (ref 0.8–1.6)

## 2025-07-07 ENCOUNTER — NON-APPOINTMENT (OUTPATIENT)
Age: 40
End: 2025-07-07

## 2025-07-07 DIAGNOSIS — O34.212 MATERNAL CARE FOR VERTICAL SCAR FROM PREVIOUS CESAREAN DELIVERY: ICD-10-CM

## 2025-07-07 DIAGNOSIS — O34.12 MATERNAL CARE FOR BENIGN TUMOR OF CORPUS UTERI, SECOND TRIMESTER: ICD-10-CM

## 2025-07-07 DIAGNOSIS — O09.292 SUPERVISION OF PREGNANCY WITH OTHER POOR REPRODUCTIVE OR OBSTETRIC HISTORY, SECOND TRIMESTER: ICD-10-CM

## 2025-07-07 DIAGNOSIS — O34.211 MATERNAL CARE FOR LOW TRANSVERSE SCAR FROM PREVIOUS CESAREAN DELIVERY: ICD-10-CM

## 2025-07-08 ENCOUNTER — APPOINTMENT (OUTPATIENT)
Dept: MATERNAL FETAL MEDICINE | Facility: CLINIC | Age: 40
End: 2025-07-08
Payer: MEDICAID

## 2025-07-08 ENCOUNTER — APPOINTMENT (OUTPATIENT)
Dept: ANTEPARTUM | Facility: CLINIC | Age: 40
End: 2025-07-08
Payer: MEDICAID

## 2025-07-08 ENCOUNTER — ASOB RESULT (OUTPATIENT)
Age: 40
End: 2025-07-08

## 2025-07-08 ENCOUNTER — OUTPATIENT (OUTPATIENT)
Dept: OUTPATIENT SERVICES | Facility: HOSPITAL | Age: 40
LOS: 1 days | End: 2025-07-08
Payer: MEDICAID

## 2025-07-08 VITALS
SYSTOLIC BLOOD PRESSURE: 155 MMHG | HEIGHT: 62 IN | BODY MASS INDEX: 35.7 KG/M2 | OXYGEN SATURATION: 99 % | WEIGHT: 194 LBS | HEART RATE: 89 BPM | DIASTOLIC BLOOD PRESSURE: 94 MMHG

## 2025-07-08 VITALS — DIASTOLIC BLOOD PRESSURE: 96 MMHG | SYSTOLIC BLOOD PRESSURE: 157 MMHG

## 2025-07-08 DIAGNOSIS — Z98.891 HISTORY OF UTERINE SCAR FROM PREVIOUS SURGERY: Chronic | ICD-10-CM

## 2025-07-08 DIAGNOSIS — O09.899 SUPERVISION OF OTHER HIGH RISK PREGNANCIES, UNSPECIFIED TRIMESTER: ICD-10-CM

## 2025-07-08 LAB
BILIRUB UR QL STRIP: NORMAL
GLUCOSE UR-MCNC: NORMAL
HCG UR QL: 0.2 EU/DL
HGB UR QL STRIP.AUTO: NORMAL
KETONES UR-MCNC: NORMAL
LEUKOCYTE ESTERASE UR QL STRIP: NORMAL
NITRITE UR QL STRIP: NORMAL
PH UR STRIP: 5.5
PROT UR STRIP-MCNC: NORMAL
SP GR UR STRIP: 1.02

## 2025-07-08 PROCEDURE — 76816 OB US FOLLOW-UP PER FETUS: CPT | Mod: 26

## 2025-07-08 PROCEDURE — 99213 OFFICE O/P EST LOW 20 MIN: CPT | Mod: GC,25

## 2025-07-08 PROCEDURE — 76816 OB US FOLLOW-UP PER FETUS: CPT

## 2025-07-08 PROCEDURE — 81003 URINALYSIS AUTO W/O SCOPE: CPT

## 2025-07-08 PROCEDURE — G0463: CPT

## 2025-07-09 DIAGNOSIS — Z36.3 ENCOUNTER FOR ANTENATAL SCREENING FOR MALFORMATIONS: ICD-10-CM

## 2025-07-09 DIAGNOSIS — Z3A.20 20 WEEKS GESTATION OF PREGNANCY: ICD-10-CM

## 2025-07-09 DIAGNOSIS — O09.299 SUPERVISION OF PREGNANCY WITH OTHER POOR REPRODUCTIVE OR OBSTETRIC HISTORY, UNSPECIFIED TRIMESTER: ICD-10-CM

## 2025-07-09 DIAGNOSIS — O09.212 SUPERVISION OF PREGNANCY WITH HISTORY OF PRE-TERM LABOR, SECOND TRIMESTER: ICD-10-CM

## 2025-07-09 DIAGNOSIS — O34.12 MATERNAL CARE FOR BENIGN TUMOR OF CORPUS UTERI, SECOND TRIMESTER: ICD-10-CM

## 2025-07-11 ENCOUNTER — NON-APPOINTMENT (OUTPATIENT)
Age: 40
End: 2025-07-11

## 2025-07-11 DIAGNOSIS — O10.919 UNSPECIFIED PRE-EXISTING HYPERTENSION COMPLICATING PREGNANCY, UNSPECIFIED TRIMESTER: ICD-10-CM

## 2025-07-11 DIAGNOSIS — O99.019 ANEMIA COMPLICATING PREGNANCY, UNSPECIFIED TRIMESTER: ICD-10-CM

## 2025-07-11 DIAGNOSIS — O09.90 SUPERVISION OF HIGH RISK PREGNANCY, UNSPECIFIED, UNSPECIFIED TRIMESTER: ICD-10-CM

## 2025-07-14 DIAGNOSIS — O10.919 UNSPECIFIED PRE-EXISTING HYPERTENSION COMPLICATING PREGNANCY, UNSPECIFIED TRIMESTER: ICD-10-CM

## 2025-07-14 DIAGNOSIS — O09.90 SUPERVISION OF HIGH RISK PREGNANCY, UNSPECIFIED, UNSPECIFIED TRIMESTER: ICD-10-CM

## 2025-07-14 DIAGNOSIS — O99.019 ANEMIA COMPLICATING PREGNANCY, UNSPECIFIED TRIMESTER: ICD-10-CM

## 2025-07-14 DIAGNOSIS — O09.529 SUPERVISION OF ELDERLY MULTIGRAVIDA, UNSPECIFIED TRIMESTER: ICD-10-CM

## 2025-07-14 DIAGNOSIS — O09.299 SUPERVISION OF PREGNANCY WITH OTHER POOR REPRODUCTIVE OR OBSTETRIC HISTORY, UNSPECIFIED TRIMESTER: ICD-10-CM

## 2025-07-14 NOTE — OB RN DELIVERY SUMMARY - BABYS CARE PROVIDER NAME, OB PROFILE
Large joint arthrocentesis: R knee    Performed by: Aracely Boone MD  Authorized by: Aracely Boone MD    Universal Protocol:  procedure performed by consultantConsent: Verbal consent obtained  Risks and benefits: risks, benefits and alternatives were discussed  Consent given by: patient  Timeout called at: 7/14/2025 2:58 PM.  Patient understanding: patient states understanding of the procedure being performed  Patient consent: the patient's understanding of the procedure matches consent given  Procedure consent: procedure consent matches procedure scheduled  Relevant documents: relevant documents present and verified  Test results: test results available and properly labeled  Site marked: the operative site was marked  Radiology Images displayed and confirmed. If images not available, report reviewed: imaging studies available  Patient identity confirmed: verbally with patient  Supporting Documentation  Indications: pain     Is this a Visco injection? Yes  Non-Pharmacologic Treatments Attempted: Diet and Home Exercise  Pharmacologic Treatments Attempted: Advil, Aleve, Tylenol  Pain Score: 7Procedure Details  Location: knee - R knee  Preparation: Patient was prepped and draped in the usual sterile fashion  Needle size: 20 G  Ultrasound guidance: no  Approach: medial  Medications administered: 20 mg Sodium Hyaluronate (Viscosup) 20 MG/2ML    Patient tolerance: patient tolerated the procedure well with no immediate complications  Dressing:  Sterile dressing applied     Told to ice 4 times a day for 4 days.           pierre

## 2025-07-15 ENCOUNTER — OUTPATIENT (OUTPATIENT)
Dept: OUTPATIENT SERVICES | Facility: HOSPITAL | Age: 40
LOS: 1 days | End: 2025-07-15
Payer: MEDICAID

## 2025-07-15 ENCOUNTER — APPOINTMENT (OUTPATIENT)
Dept: MATERNAL FETAL MEDICINE | Facility: CLINIC | Age: 40
End: 2025-07-15

## 2025-07-15 VITALS
OXYGEN SATURATION: 99 % | HEIGHT: 62 IN | DIASTOLIC BLOOD PRESSURE: 80 MMHG | SYSTOLIC BLOOD PRESSURE: 140 MMHG | BODY MASS INDEX: 35.61 KG/M2 | WEIGHT: 193.5 LBS | HEART RATE: 110 BPM

## 2025-07-15 VITALS — SYSTOLIC BLOOD PRESSURE: 130 MMHG | DIASTOLIC BLOOD PRESSURE: 80 MMHG | HEART RATE: 112 BPM

## 2025-07-15 DIAGNOSIS — O99.210 OBESITY COMPLICATING PREGNANCY, UNSPECIFIED TRIMESTER: ICD-10-CM

## 2025-07-15 DIAGNOSIS — O09.90 SUPERVISION OF HIGH RISK PREGNANCY, UNSPECIFIED, UNSPECIFIED TRIMESTER: ICD-10-CM

## 2025-07-15 DIAGNOSIS — O10.919 UNSPECIFIED PRE-EXISTING HYPERTENSION COMPLICATING PREGNANCY, UNSPECIFIED TRIMESTER: ICD-10-CM

## 2025-07-15 DIAGNOSIS — O99.019 ANEMIA COMPLICATING PREGNANCY, UNSPECIFIED TRIMESTER: ICD-10-CM

## 2025-07-15 DIAGNOSIS — O09.899 SUPERVISION OF OTHER HIGH RISK PREGNANCIES, UNSPECIFIED TRIMESTER: ICD-10-CM

## 2025-07-15 PROCEDURE — G0463: CPT

## 2025-07-15 PROCEDURE — 81003 URINALYSIS AUTO W/O SCOPE: CPT

## 2025-07-15 PROCEDURE — 87086 URINE CULTURE/COLONY COUNT: CPT

## 2025-07-15 PROCEDURE — 99213 OFFICE O/P EST LOW 20 MIN: CPT | Mod: GC,25

## 2025-07-15 RX ORDER — LABETALOL HYDROCHLORIDE 200 MG/1
200 TABLET, FILM COATED ORAL
Qty: 60 | Refills: 3 | Status: DISCONTINUED | COMMUNITY
Start: 2025-07-15 | End: 2025-07-15

## 2025-07-29 ENCOUNTER — ASOB RESULT (OUTPATIENT)
Age: 40
End: 2025-07-29

## 2025-07-29 ENCOUNTER — OUTPATIENT (OUTPATIENT)
Dept: OUTPATIENT SERVICES | Facility: HOSPITAL | Age: 40
LOS: 1 days | End: 2025-07-29
Payer: MEDICAID

## 2025-07-29 ENCOUNTER — APPOINTMENT (OUTPATIENT)
Dept: MATERNAL FETAL MEDICINE | Facility: CLINIC | Age: 40
End: 2025-07-29

## 2025-07-29 ENCOUNTER — APPOINTMENT (OUTPATIENT)
Dept: ANTEPARTUM | Facility: CLINIC | Age: 40
End: 2025-07-29
Payer: MEDICAID

## 2025-07-29 VITALS
WEIGHT: 196 LBS | HEIGHT: 62 IN | OXYGEN SATURATION: 98 % | BODY MASS INDEX: 36.07 KG/M2 | DIASTOLIC BLOOD PRESSURE: 81 MMHG | HEART RATE: 90 BPM | SYSTOLIC BLOOD PRESSURE: 122 MMHG

## 2025-07-29 DIAGNOSIS — J45.909 DISEASES OF THE RESPIRATORY SYSTEM COMPLICATING PREGNANCY, UNSPECIFIED TRIMESTER: ICD-10-CM

## 2025-07-29 DIAGNOSIS — O09.899 SUPERVISION OF OTHER HIGH RISK PREGNANCIES, UNSPECIFIED TRIMESTER: ICD-10-CM

## 2025-07-29 DIAGNOSIS — O99.519 DISEASES OF THE RESPIRATORY SYSTEM COMPLICATING PREGNANCY, UNSPECIFIED TRIMESTER: ICD-10-CM

## 2025-07-29 DIAGNOSIS — O10.919 UNSPECIFIED PRE-EXISTING HYPERTENSION COMPLICATING PREGNANCY, UNSPECIFIED TRIMESTER: ICD-10-CM

## 2025-07-29 LAB
BILIRUB UR QL STRIP: NORMAL
GLUCOSE UR-MCNC: NORMAL
HCG UR QL: 0.2 EU/DL
HGB UR QL STRIP.AUTO: NORMAL
KETONES UR-MCNC: NORMAL
LEUKOCYTE ESTERASE UR QL STRIP: NORMAL
NITRITE UR QL STRIP: NORMAL
PH UR STRIP: 6.5
PROT UR STRIP-MCNC: NORMAL
SP GR UR STRIP: 1.01

## 2025-07-29 PROCEDURE — 76816 OB US FOLLOW-UP PER FETUS: CPT | Mod: 26

## 2025-07-29 PROCEDURE — G0463: CPT

## 2025-07-29 PROCEDURE — 99213 OFFICE O/P EST LOW 20 MIN: CPT | Mod: GC,25

## 2025-07-29 PROCEDURE — 76816 OB US FOLLOW-UP PER FETUS: CPT

## 2025-07-29 PROCEDURE — 81003 URINALYSIS AUTO W/O SCOPE: CPT

## 2025-07-29 RX ORDER — DIPHENHYDRAMINE HCL 25 MG/1
25 CAPSULE ORAL
Qty: 1 | Refills: 1 | Status: ACTIVE | COMMUNITY
Start: 2025-07-29 | End: 1900-01-01

## 2025-07-31 DIAGNOSIS — O34.212 MATERNAL CARE FOR VERTICAL SCAR FROM PREVIOUS CESAREAN DELIVERY: ICD-10-CM

## 2025-07-31 DIAGNOSIS — O09.212 SUPERVISION OF PREGNANCY WITH HISTORY OF PRE-TERM LABOR, SECOND TRIMESTER: ICD-10-CM

## 2025-07-31 DIAGNOSIS — O34.211 MATERNAL CARE FOR LOW TRANSVERSE SCAR FROM PREVIOUS CESAREAN DELIVERY: ICD-10-CM

## 2025-07-31 DIAGNOSIS — Z36.4 ENCOUNTER FOR ANTENATAL SCREENING FOR FETAL GROWTH RETARDATION: ICD-10-CM

## 2025-07-31 DIAGNOSIS — Z3A.23 23 WEEKS GESTATION OF PREGNANCY: ICD-10-CM

## 2025-08-01 ENCOUNTER — RESULT REVIEW (OUTPATIENT)
Age: 40
End: 2025-08-01

## 2025-08-01 ENCOUNTER — APPOINTMENT (OUTPATIENT)
Dept: CV DIAGNOSITCS | Facility: HOSPITAL | Age: 40
End: 2025-08-01

## 2025-08-01 ENCOUNTER — OUTPATIENT (OUTPATIENT)
Dept: OUTPATIENT SERVICES | Facility: HOSPITAL | Age: 40
LOS: 1 days | End: 2025-08-01
Payer: MEDICAID

## 2025-08-01 DIAGNOSIS — Z98.891 HISTORY OF UTERINE SCAR FROM PREVIOUS SURGERY: Chronic | ICD-10-CM

## 2025-08-01 DIAGNOSIS — I25.10 ATHEROSCLEROTIC HEART DISEASE OF NATIVE CORONARY ARTERY WITHOUT ANGINA PECTORIS: ICD-10-CM

## 2025-08-01 DIAGNOSIS — Z33.2 ENCOUNTER FOR ELECTIVE TERMINATION OF PREGNANCY: Chronic | ICD-10-CM

## 2025-08-01 PROCEDURE — 93306 TTE W/DOPPLER COMPLETE: CPT | Mod: 26

## 2025-08-01 PROCEDURE — 76376 3D RENDER W/INTRP POSTPROCES: CPT | Mod: 26

## 2025-08-08 ENCOUNTER — APPOINTMENT (OUTPATIENT)
Dept: HEMATOLOGY ONCOLOGY | Facility: CLINIC | Age: 40
End: 2025-08-08
Payer: MEDICAID

## 2025-08-08 ENCOUNTER — RESULT REVIEW (OUTPATIENT)
Age: 40
End: 2025-08-08

## 2025-08-08 VITALS
TEMPERATURE: 97.3 F | BODY MASS INDEX: 33.76 KG/M2 | OXYGEN SATURATION: 98 % | HEART RATE: 97 BPM | HEIGHT: 64.72 IN | RESPIRATION RATE: 17 BRPM | WEIGHT: 200.16 LBS | DIASTOLIC BLOOD PRESSURE: 80 MMHG | SYSTOLIC BLOOD PRESSURE: 149 MMHG

## 2025-08-08 PROCEDURE — 99204 OFFICE O/P NEW MOD 45 MIN: CPT

## 2025-08-11 LAB — FERRITIN SERPL-MCNC: 15 NG/ML

## 2025-08-12 ENCOUNTER — RESULT REVIEW (OUTPATIENT)
Age: 40
End: 2025-08-12

## 2025-08-12 ENCOUNTER — APPOINTMENT (OUTPATIENT)
Dept: HEMATOLOGY ONCOLOGY | Facility: CLINIC | Age: 40
End: 2025-08-12
Payer: MEDICAID

## 2025-08-12 ENCOUNTER — OUTPATIENT (OUTPATIENT)
Dept: OUTPATIENT SERVICES | Facility: HOSPITAL | Age: 40
LOS: 1 days | End: 2025-08-12

## 2025-08-12 ENCOUNTER — APPOINTMENT (OUTPATIENT)
Dept: MATERNAL FETAL MEDICINE | Facility: CLINIC | Age: 40
End: 2025-08-12
Payer: MEDICAID

## 2025-08-12 VITALS
WEIGHT: 200 LBS | HEIGHT: 64.72 IN | OXYGEN SATURATION: 99 % | DIASTOLIC BLOOD PRESSURE: 80 MMHG | HEART RATE: 94 BPM | BODY MASS INDEX: 33.73 KG/M2 | SYSTOLIC BLOOD PRESSURE: 134 MMHG

## 2025-08-12 VITALS
SYSTOLIC BLOOD PRESSURE: 136 MMHG | HEART RATE: 98 BPM | RESPIRATION RATE: 16 BRPM | WEIGHT: 200 LBS | HEIGHT: 64.72 IN | DIASTOLIC BLOOD PRESSURE: 88 MMHG | TEMPERATURE: 97.9 F | OXYGEN SATURATION: 98 % | BODY MASS INDEX: 33.73 KG/M2

## 2025-08-12 DIAGNOSIS — O99.019 ANEMIA COMPLICATING PREGNANCY, UNSPECIFIED TRIMESTER: ICD-10-CM

## 2025-08-12 DIAGNOSIS — Z33.2 ENCOUNTER FOR ELECTIVE TERMINATION OF PREGNANCY: Chronic | ICD-10-CM

## 2025-08-12 DIAGNOSIS — O34.219 MATERNAL CARE FOR UNSPECIFIED TYPE SCAR FROM PREVIOUS CESAREAN DELIVERY: ICD-10-CM

## 2025-08-12 DIAGNOSIS — O09.899 SUPERVISION OF OTHER HIGH RISK PREGNANCIES, UNSPECIFIED TRIMESTER: ICD-10-CM

## 2025-08-12 DIAGNOSIS — O12.10 GESTATIONAL PROTEINURIA, UNSPECIFIED TRIMESTER: ICD-10-CM

## 2025-08-12 DIAGNOSIS — O09.90 SUPERVISION OF HIGH RISK PREGNANCY, UNSPECIFIED, UNSPECIFIED TRIMESTER: ICD-10-CM

## 2025-08-12 DIAGNOSIS — O09.299 SUPERVISION OF PREGNANCY WITH OTHER POOR REPRODUCTIVE OR OBSTETRIC HISTORY, UNSPECIFIED TRIMESTER: ICD-10-CM

## 2025-08-12 DIAGNOSIS — O09.529 SUPERVISION OF ELDERLY MULTIGRAVIDA, UNSPECIFIED TRIMESTER: ICD-10-CM

## 2025-08-12 PROCEDURE — 81003 URINALYSIS AUTO W/O SCOPE: CPT

## 2025-08-12 PROCEDURE — 99213 OFFICE O/P EST LOW 20 MIN: CPT

## 2025-08-12 PROCEDURE — 99213 OFFICE O/P EST LOW 20 MIN: CPT | Mod: GC,25

## 2025-08-12 PROCEDURE — G0463: CPT

## 2025-08-14 LAB — GLUCOSE 1H P 50 G GLC PO SERPL-MCNC: 72 MG/DL

## 2025-08-21 ENCOUNTER — APPOINTMENT (OUTPATIENT)
Dept: CARDIOLOGY | Facility: CLINIC | Age: 40
End: 2025-08-21
Payer: MEDICAID

## 2025-08-21 VITALS
HEIGHT: 62 IN | TEMPERATURE: 98.1 F | RESPIRATION RATE: 16 BRPM | SYSTOLIC BLOOD PRESSURE: 108 MMHG | BODY MASS INDEX: 36.8 KG/M2 | WEIGHT: 200 LBS | DIASTOLIC BLOOD PRESSURE: 73 MMHG | HEART RATE: 101 BPM | OXYGEN SATURATION: 99 %

## 2025-08-21 PROCEDURE — 99214 OFFICE O/P EST MOD 30 MIN: CPT

## 2025-08-21 PROCEDURE — 93000 ELECTROCARDIOGRAM COMPLETE: CPT

## 2025-08-21 PROCEDURE — G2211 COMPLEX E/M VISIT ADD ON: CPT | Mod: NC

## 2025-08-22 ENCOUNTER — APPOINTMENT (OUTPATIENT)
Dept: INFUSION THERAPY | Facility: HOSPITAL | Age: 40
End: 2025-08-22

## 2025-08-26 ENCOUNTER — OUTPATIENT (OUTPATIENT)
Dept: OUTPATIENT SERVICES | Facility: HOSPITAL | Age: 40
LOS: 1 days | End: 2025-08-26
Payer: MEDICAID

## 2025-08-26 ENCOUNTER — APPOINTMENT (OUTPATIENT)
Dept: MATERNAL FETAL MEDICINE | Facility: CLINIC | Age: 40
End: 2025-08-26

## 2025-08-26 ENCOUNTER — ASOB RESULT (OUTPATIENT)
Age: 40
End: 2025-08-26

## 2025-08-26 ENCOUNTER — APPOINTMENT (OUTPATIENT)
Dept: ANTEPARTUM | Facility: CLINIC | Age: 40
End: 2025-08-26
Payer: MEDICAID

## 2025-08-26 VITALS
DIASTOLIC BLOOD PRESSURE: 80 MMHG | HEIGHT: 62 IN | OXYGEN SATURATION: 99 % | HEART RATE: 100 BPM | WEIGHT: 208.5 LBS | SYSTOLIC BLOOD PRESSURE: 154 MMHG | BODY MASS INDEX: 38.37 KG/M2

## 2025-08-26 DIAGNOSIS — O12.10 GESTATIONAL PROTEINURIA, UNSPECIFIED TRIMESTER: ICD-10-CM

## 2025-08-26 DIAGNOSIS — O09.529 SUPERVISION OF ELDERLY MULTIGRAVIDA, UNSPECIFIED TRIMESTER: ICD-10-CM

## 2025-08-26 DIAGNOSIS — O99.210 OBESITY COMPLICATING PREGNANCY, UNSPECIFIED TRIMESTER: ICD-10-CM

## 2025-08-26 DIAGNOSIS — O99.019 ANEMIA COMPLICATING PREGNANCY, UNSPECIFIED TRIMESTER: ICD-10-CM

## 2025-08-26 DIAGNOSIS — O09.899 SUPERVISION OF OTHER HIGH RISK PREGNANCIES, UNSPECIFIED TRIMESTER: ICD-10-CM

## 2025-08-26 DIAGNOSIS — O34.219 MATERNAL CARE FOR UNSPECIFIED TYPE SCAR FROM PREVIOUS CESAREAN DELIVERY: ICD-10-CM

## 2025-08-26 DIAGNOSIS — O10.919 UNSPECIFIED PRE-EXISTING HYPERTENSION COMPLICATING PREGNANCY, UNSPECIFIED TRIMESTER: ICD-10-CM

## 2025-08-26 DIAGNOSIS — O09.299 SUPERVISION OF PREGNANCY WITH OTHER POOR REPRODUCTIVE OR OBSTETRIC HISTORY, UNSPECIFIED TRIMESTER: ICD-10-CM

## 2025-08-26 LAB
BILIRUB UR QL STRIP: NORMAL
GLUCOSE UR-MCNC: 500
HCG UR QL: 0.2 EU/DL
HGB UR QL STRIP.AUTO: NORMAL
KETONES UR-MCNC: NORMAL
LEUKOCYTE ESTERASE UR QL STRIP: NORMAL
NITRITE UR QL STRIP: NORMAL
PH UR STRIP: 6
PROT UR STRIP-MCNC: 30
SP GR UR STRIP: 1.02

## 2025-08-26 PROCEDURE — 81003 URINALYSIS AUTO W/O SCOPE: CPT

## 2025-08-26 PROCEDURE — 99213 OFFICE O/P EST LOW 20 MIN: CPT | Mod: GC,25

## 2025-08-26 PROCEDURE — 76816 OB US FOLLOW-UP PER FETUS: CPT | Mod: 26

## 2025-08-26 PROCEDURE — 76816 OB US FOLLOW-UP PER FETUS: CPT

## 2025-08-26 PROCEDURE — G0463: CPT

## 2025-08-28 DIAGNOSIS — Z36.4 ENCOUNTER FOR ANTENATAL SCREENING FOR FETAL GROWTH RETARDATION: ICD-10-CM

## 2025-08-28 DIAGNOSIS — O34.12 MATERNAL CARE FOR BENIGN TUMOR OF CORPUS UTERI, SECOND TRIMESTER: ICD-10-CM

## 2025-08-28 DIAGNOSIS — O34.211 MATERNAL CARE FOR LOW TRANSVERSE SCAR FROM PREVIOUS CESAREAN DELIVERY: ICD-10-CM

## 2025-08-28 DIAGNOSIS — Z3A.27 27 WEEKS GESTATION OF PREGNANCY: ICD-10-CM

## 2025-08-28 DIAGNOSIS — O09.292 SUPERVISION OF PREGNANCY WITH OTHER POOR REPRODUCTIVE OR OBSTETRIC HISTORY, SECOND TRIMESTER: ICD-10-CM

## 2025-08-29 ENCOUNTER — APPOINTMENT (OUTPATIENT)
Dept: INFUSION THERAPY | Facility: HOSPITAL | Age: 40
End: 2025-08-29

## 2025-09-05 ENCOUNTER — APPOINTMENT (OUTPATIENT)
Dept: INFUSION THERAPY | Facility: HOSPITAL | Age: 40
End: 2025-09-05

## 2025-09-09 ENCOUNTER — ASOB RESULT (OUTPATIENT)
Age: 40
End: 2025-09-09

## 2025-09-09 ENCOUNTER — APPOINTMENT (OUTPATIENT)
Dept: ANTEPARTUM | Facility: CLINIC | Age: 40
End: 2025-09-09
Payer: MEDICAID

## 2025-09-09 ENCOUNTER — APPOINTMENT (OUTPATIENT)
Dept: MATERNAL FETAL MEDICINE | Facility: CLINIC | Age: 40
End: 2025-09-09

## 2025-09-09 ENCOUNTER — LABORATORY RESULT (OUTPATIENT)
Age: 40
End: 2025-09-09

## 2025-09-09 ENCOUNTER — APPOINTMENT (OUTPATIENT)
Dept: ANTEPARTUM | Facility: CLINIC | Age: 40
End: 2025-09-09

## 2025-09-09 VITALS
HEIGHT: 62 IN | SYSTOLIC BLOOD PRESSURE: 156 MMHG | OXYGEN SATURATION: 98 % | HEART RATE: 110 BPM | DIASTOLIC BLOOD PRESSURE: 100 MMHG | BODY MASS INDEX: 38 KG/M2 | WEIGHT: 206.5 LBS

## 2025-09-09 DIAGNOSIS — D64.9 ANEMIA, UNSPECIFIED: ICD-10-CM

## 2025-09-09 DIAGNOSIS — O99.519 DISEASES OF THE RESPIRATORY SYSTEM COMPLICATING PREGNANCY, UNSPECIFIED TRIMESTER: ICD-10-CM

## 2025-09-09 DIAGNOSIS — O22.00 VARICOSE VEINS OF LOWER EXTREMITY IN PREGNANCY, UNSPECIFIED TRIMESTER: ICD-10-CM

## 2025-09-09 DIAGNOSIS — J45.909 DISEASES OF THE RESPIRATORY SYSTEM COMPLICATING PREGNANCY, UNSPECIFIED TRIMESTER: ICD-10-CM

## 2025-09-09 DIAGNOSIS — O99.210 OBESITY COMPLICATING PREGNANCY, UNSPECIFIED TRIMESTER: ICD-10-CM

## 2025-09-09 LAB
BILIRUB UR QL STRIP: NORMAL
GLUCOSE UR-MCNC: 1000
HCG UR QL: 0.2 EU/DL
HGB UR QL STRIP.AUTO: NORMAL
KETONES UR-MCNC: NORMAL
LEUKOCYTE ESTERASE UR QL STRIP: NORMAL
NITRITE UR QL STRIP: NORMAL
PH UR STRIP: 5.5
PROT UR STRIP-MCNC: 100
SP GR UR STRIP: 1.02

## 2025-09-09 PROCEDURE — 76818 FETAL BIOPHYS PROFILE W/NST: CPT | Mod: 26

## 2025-09-09 PROCEDURE — 76817 TRANSVAGINAL US OBSTETRIC: CPT | Mod: 26

## 2025-09-10 ENCOUNTER — NON-APPOINTMENT (OUTPATIENT)
Age: 40
End: 2025-09-10

## 2025-09-11 ENCOUNTER — LABORATORY RESULT (OUTPATIENT)
Age: 40
End: 2025-09-11

## 2025-09-11 ENCOUNTER — ASOB RESULT (OUTPATIENT)
Age: 40
End: 2025-09-11

## 2025-09-11 ENCOUNTER — APPOINTMENT (OUTPATIENT)
Dept: ANTEPARTUM | Facility: CLINIC | Age: 40
End: 2025-09-11

## 2025-09-11 VITALS — SYSTOLIC BLOOD PRESSURE: 136 MMHG | DIASTOLIC BLOOD PRESSURE: 87 MMHG

## 2025-09-11 PROCEDURE — 76815 OB US LIMITED FETUS(S): CPT

## 2025-09-11 PROCEDURE — 76819 FETAL BIOPHYS PROFIL W/O NST: CPT

## 2025-09-11 PROCEDURE — 76817 TRANSVAGINAL US OBSTETRIC: CPT

## 2025-09-12 ENCOUNTER — APPOINTMENT (OUTPATIENT)
Dept: INFUSION THERAPY | Facility: HOSPITAL | Age: 40
End: 2025-09-12

## 2025-09-12 ENCOUNTER — APPOINTMENT (OUTPATIENT)
Dept: ANTEPARTUM | Facility: HOSPITAL | Age: 40
End: 2025-09-12

## 2025-09-15 ENCOUNTER — TRANSCRIPTION ENCOUNTER (OUTPATIENT)
Age: 40
End: 2025-09-15

## 2025-09-15 ENCOUNTER — ASOB RESULT (OUTPATIENT)
Age: 40
End: 2025-09-15

## 2025-09-15 ENCOUNTER — APPOINTMENT (OUTPATIENT)
Dept: ANTEPARTUM | Facility: CLINIC | Age: 40
End: 2025-09-15
Payer: MEDICAID

## 2025-09-15 PROBLEM — F41.9 ANXIETY DISORDER, UNSPECIFIED: Chronic | Status: ACTIVE | Noted: 2025-09-13

## 2025-09-15 PROBLEM — K42.9 UMBILICAL HERNIA WITHOUT OBSTRUCTION OR GANGRENE: Chronic | Status: ACTIVE | Noted: 2025-09-13

## 2025-09-15 PROBLEM — O99.019 ANEMIA COMPLICATING PREGNANCY, UNSPECIFIED TRIMESTER: Chronic | Status: ACTIVE | Noted: 2025-09-12

## 2025-09-15 PROCEDURE — 76818 FETAL BIOPHYS PROFILE W/NST: CPT | Mod: 26

## 2025-09-16 ENCOUNTER — NON-APPOINTMENT (OUTPATIENT)
Age: 40
End: 2025-09-16

## 2025-09-16 ENCOUNTER — APPOINTMENT (OUTPATIENT)
Dept: MATERNAL FETAL MEDICINE | Facility: CLINIC | Age: 40
End: 2025-09-16

## 2025-09-16 VITALS
HEART RATE: 101 BPM | WEIGHT: 214 LBS | HEIGHT: 62 IN | DIASTOLIC BLOOD PRESSURE: 82 MMHG | OXYGEN SATURATION: 98 % | BODY MASS INDEX: 39.38 KG/M2 | SYSTOLIC BLOOD PRESSURE: 124 MMHG

## 2025-09-16 DIAGNOSIS — O09.529 SUPERVISION OF ELDERLY MULTIGRAVIDA, UNSPECIFIED TRIMESTER: ICD-10-CM

## 2025-09-16 DIAGNOSIS — O34.219 MATERNAL CARE FOR UNSPECIFIED TYPE SCAR FROM PREVIOUS CESAREAN DELIVERY: ICD-10-CM

## 2025-09-16 DIAGNOSIS — O09.90 SUPERVISION OF HIGH RISK PREGNANCY, UNSPECIFIED, UNSPECIFIED TRIMESTER: ICD-10-CM

## 2025-09-16 DIAGNOSIS — O12.10 GESTATIONAL PROTEINURIA, UNSPECIFIED TRIMESTER: ICD-10-CM

## 2025-09-16 DIAGNOSIS — O24.113 PRE-EXISTING TYPE 2 DIABETES MELLITUS, IN PREGNANCY, THIRD TRIMESTER: ICD-10-CM

## 2025-09-16 DIAGNOSIS — O24.119 PRE-EXISTING TYPE 2 DIABETES MELLITUS, TYPE 2, IN PREGNANCY, UNSPECIFIED TRIMESTER: ICD-10-CM

## 2025-09-16 DIAGNOSIS — O09.899 SUPERVISION OF OTHER HIGH RISK PREGNANCIES, UNSPECIFIED TRIMESTER: ICD-10-CM

## 2025-09-16 LAB
ALBUMIN SERPL ELPH-MCNC: 3.6 G/DL
ALP BLD-CCNC: 115 U/L
ALT SERPL-CCNC: 14 U/L
ANION GAP SERPL CALC-SCNC: 18 MMOL/L
AST SERPL-CCNC: 11 U/L
BILIRUB SERPL-MCNC: 0.2 MG/DL
BILIRUB UR QL STRIP: NORMAL
BUN SERPL-MCNC: 10 MG/DL
CALCIUM SERPL-MCNC: 10 MG/DL
CHLORIDE SERPL-SCNC: 96 MMOL/L
CO2 SERPL-SCNC: 20 MMOL/L
CREAT SERPL-MCNC: 0.53 MG/DL
EGFRCR SERPLBLD CKD-EPI 2021: 121 ML/MIN/1.73M2
GLUCOSE BLDC GLUCOMTR-MCNC: 100
GLUCOSE SERPL-MCNC: 319 MG/DL
GLUCOSE UR-MCNC: NORMAL
HCG UR QL: 1 EU/DL
HCT VFR BLD CALC: 41.5 %
HGB BLD-MCNC: 12.2 G/DL
HGB UR QL STRIP.AUTO: NORMAL
KETONES UR-MCNC: NORMAL
LEUKOCYTE ESTERASE UR QL STRIP: NORMAL
MCHC RBC-ENTMCNC: 22.3 PG
MCHC RBC-ENTMCNC: 29.4 G/DL
MCV RBC AUTO: 75.7 FL
NITRITE UR QL STRIP: NORMAL
PH UR STRIP: 6
PLATELET # BLD AUTO: 243 K/UL
POTASSIUM SERPL-SCNC: 4.4 MMOL/L
PROT SERPL-MCNC: 6.8 G/DL
PROT UR STRIP-MCNC: 30
RBC # BLD: 5.48 M/UL
RBC # FLD: 27 %
SODIUM SERPL-SCNC: 133 MMOL/L
SP GR UR STRIP: 1.02
WBC # FLD AUTO: 5.09 K/UL

## 2025-09-16 RX ORDER — BLOOD SUGAR DIAGNOSTIC
STRIP MISCELLANEOUS
Qty: 2 | Refills: 2 | Status: ACTIVE | COMMUNITY
Start: 2025-09-16 | End: 1900-01-01

## 2025-09-16 RX ORDER — BLOOD-GLUCOSE SENSOR
EACH MISCELLANEOUS
Qty: 3 | Refills: 2 | Status: ACTIVE | COMMUNITY
Start: 2025-09-16 | End: 1900-01-01

## 2025-09-16 RX ORDER — LANCETS
EACH MISCELLANEOUS
Qty: 2 | Refills: 2 | Status: ACTIVE | COMMUNITY
Start: 2025-09-16 | End: 1900-01-01

## 2025-09-17 ENCOUNTER — RESULT REVIEW (OUTPATIENT)
Age: 40
End: 2025-09-17

## 2025-09-17 ENCOUNTER — APPOINTMENT (OUTPATIENT)
Dept: INFUSION THERAPY | Facility: HOSPITAL | Age: 40
End: 2025-09-17

## 2025-09-17 ENCOUNTER — NON-APPOINTMENT (OUTPATIENT)
Age: 40
End: 2025-09-17

## 2025-09-17 PROBLEM — O24.119 TYPE 2 DIABETES MELLITUS AFFECTING PREGNANCY, ANTEPARTUM: Status: ACTIVE | Noted: 2025-09-17

## 2025-09-19 ENCOUNTER — APPOINTMENT (OUTPATIENT)
Dept: ANTEPARTUM | Facility: CLINIC | Age: 40
End: 2025-09-19

## 2025-09-19 ENCOUNTER — ASOB RESULT (OUTPATIENT)
Age: 40
End: 2025-09-19

## 2025-09-19 ENCOUNTER — LABORATORY RESULT (OUTPATIENT)
Age: 40
End: 2025-09-19

## 2025-09-22 LAB
ALBUMIN SERPL ELPH-MCNC: 3 G/DL
ALP BLD-CCNC: 97 U/L
ALT SERPL-CCNC: 16 U/L
ANION GAP SERPL CALC-SCNC: 16 MMOL/L
AST SERPL-CCNC: 19 U/L
BILIRUB SERPL-MCNC: 0.2 MG/DL
BUN SERPL-MCNC: 8 MG/DL
CALCIUM SERPL-MCNC: 8.8 MG/DL
CHLORIDE SERPL-SCNC: 101 MMOL/L
CO2 SERPL-SCNC: 20 MMOL/L
CREAT SERPL-MCNC: 0.51 MG/DL
EGFRCR SERPLBLD CKD-EPI 2021: 122 ML/MIN/1.73M2
GLUCOSE SERPL-MCNC: 60 MG/DL
POTASSIUM SERPL-SCNC: 3.9 MMOL/L
PROT SERPL-MCNC: 5.9 G/DL
SODIUM SERPL-SCNC: 138 MMOL/L

## 2025-09-23 PROBLEM — Z23 ENCOUNTER FOR IMMUNIZATION: Status: ACTIVE | Noted: 2025-09-23 | Resolved: 2025-10-07
